# Patient Record
Sex: FEMALE | Race: WHITE | NOT HISPANIC OR LATINO | Employment: FULL TIME | URBAN - NONMETROPOLITAN AREA
[De-identification: names, ages, dates, MRNs, and addresses within clinical notes are randomized per-mention and may not be internally consistent; named-entity substitution may affect disease eponyms.]

---

## 2018-08-11 ENCOUNTER — APPOINTMENT (OUTPATIENT)
Dept: LAB | Facility: HOSPITAL | Age: 23
End: 2018-08-11
Payer: COMMERCIAL

## 2018-08-11 ENCOUNTER — TRANSCRIBE ORDERS (OUTPATIENT)
Dept: ADMINISTRATIVE | Facility: HOSPITAL | Age: 23
End: 2018-08-11

## 2018-08-11 DIAGNOSIS — R73.09 OTHER ABNORMAL GLUCOSE: ICD-10-CM

## 2018-08-11 DIAGNOSIS — N92.6 IRREGULAR MENSTRUAL CYCLE: ICD-10-CM

## 2018-08-11 DIAGNOSIS — I37.1 PULMONARY VALVE INSUFFICIENCY, UNSPECIFIED ETIOLOGY: ICD-10-CM

## 2018-08-11 DIAGNOSIS — Z86.79 PERSONAL HISTORY OF CARDIOVASCULAR DISORDER: Primary | ICD-10-CM

## 2018-08-11 DIAGNOSIS — Z86.79 PERSONAL HISTORY OF CARDIOVASCULAR DISORDER: ICD-10-CM

## 2018-08-11 DIAGNOSIS — Q22.0 CONGENITAL ATRESIA OF PULMONARY VALVE: ICD-10-CM

## 2018-08-11 LAB
ALBUMIN SERPL BCP-MCNC: 4 G/DL (ref 3.5–5)
ALP SERPL-CCNC: 123 U/L (ref 46–116)
ALT SERPL W P-5'-P-CCNC: 41 U/L (ref 12–78)
ANION GAP SERPL CALCULATED.3IONS-SCNC: 5 MMOL/L (ref 4–13)
AST SERPL W P-5'-P-CCNC: 29 U/L (ref 5–45)
BILIRUB SERPL-MCNC: 0.7 MG/DL (ref 0.2–1)
BUN SERPL-MCNC: 15 MG/DL (ref 5–25)
CALCIUM SERPL-MCNC: 9.1 MG/DL (ref 8.3–10.1)
CHLORIDE SERPL-SCNC: 102 MMOL/L (ref 100–108)
CHOLEST SERPL-MCNC: 151 MG/DL (ref 50–200)
CO2 SERPL-SCNC: 30 MMOL/L (ref 21–32)
CREAT SERPL-MCNC: 0.75 MG/DL (ref 0.6–1.3)
DIGOXIN SERPL-MCNC: 1.1 NG/ML (ref 0.8–2)
EST. AVERAGE GLUCOSE BLD GHB EST-MCNC: 128 MG/DL
GFR SERPL CREATININE-BSD FRML MDRD: 114 ML/MIN/1.73SQ M
GLUCOSE P FAST SERPL-MCNC: 98 MG/DL (ref 65–99)
HBA1C MFR BLD: 6.1 % (ref 4.2–6.3)
HDLC SERPL-MCNC: 40 MG/DL (ref 40–60)
INSULIN SERPL-ACNC: 37 MU/L (ref 3–25)
LDLC SERPL CALC-MCNC: 92 MG/DL (ref 0–100)
NONHDLC SERPL-MCNC: 111 MG/DL
NT-PROBNP SERPL-MCNC: 152 PG/ML
POTASSIUM SERPL-SCNC: 3.8 MMOL/L (ref 3.5–5.3)
PROT SERPL-MCNC: 7.6 G/DL (ref 6.4–8.2)
SODIUM SERPL-SCNC: 137 MMOL/L (ref 136–145)
T4 FREE SERPL-MCNC: 1.16 NG/DL (ref 0.76–1.46)
TRIGL SERPL-MCNC: 93 MG/DL
TSH SERPL DL<=0.05 MIU/L-ACNC: 2.94 UIU/ML (ref 0.36–3.74)

## 2018-08-11 PROCEDURE — 80162 ASSAY OF DIGOXIN TOTAL: CPT

## 2018-08-11 PROCEDURE — 83525 ASSAY OF INSULIN: CPT

## 2018-08-11 PROCEDURE — 84439 ASSAY OF FREE THYROXINE: CPT

## 2018-08-11 PROCEDURE — 36415 COLL VENOUS BLD VENIPUNCTURE: CPT

## 2018-08-11 PROCEDURE — 80053 COMPREHEN METABOLIC PANEL: CPT

## 2018-08-11 PROCEDURE — 83880 ASSAY OF NATRIURETIC PEPTIDE: CPT

## 2018-08-11 PROCEDURE — 80061 LIPID PANEL: CPT

## 2018-08-11 PROCEDURE — 84443 ASSAY THYROID STIM HORMONE: CPT

## 2018-08-11 PROCEDURE — 83036 HEMOGLOBIN GLYCOSYLATED A1C: CPT

## 2020-10-19 ENCOUNTER — OFFICE VISIT (OUTPATIENT)
Dept: URGENT CARE | Facility: CLINIC | Age: 25
End: 2020-10-19
Payer: COMMERCIAL

## 2020-10-19 VITALS
BODY MASS INDEX: 34.15 KG/M2 | OXYGEN SATURATION: 92 % | RESPIRATION RATE: 18 BRPM | TEMPERATURE: 97 F | WEIGHT: 200 LBS | HEIGHT: 64 IN | HEART RATE: 104 BPM

## 2020-10-19 DIAGNOSIS — Z20.822 ENCOUNTER FOR LABORATORY TESTING FOR COVID-19 VIRUS: Primary | ICD-10-CM

## 2020-10-19 DIAGNOSIS — J06.9 VIRAL URI: ICD-10-CM

## 2020-10-19 PROCEDURE — 99203 OFFICE O/P NEW LOW 30 MIN: CPT | Performed by: EMERGENCY MEDICINE

## 2020-10-19 PROCEDURE — U0003 INFECTIOUS AGENT DETECTION BY NUCLEIC ACID (DNA OR RNA); SEVERE ACUTE RESPIRATORY SYNDROME CORONAVIRUS 2 (SARS-COV-2) (CORONAVIRUS DISEASE [COVID-19]), AMPLIFIED PROBE TECHNIQUE, MAKING USE OF HIGH THROUGHPUT TECHNOLOGIES AS DESCRIBED BY CMS-2020-01-R: HCPCS | Performed by: EMERGENCY MEDICINE

## 2020-10-19 RX ORDER — HYDROCHLOROTHIAZIDE 12.5 MG/1
12.5 TABLET ORAL DAILY
COMMUNITY

## 2020-10-19 RX ORDER — DIGOXIN 250 MCG
250 TABLET ORAL EVERY MORNING
COMMUNITY
Start: 2020-05-13

## 2020-10-21 LAB — SARS-COV-2 RNA SPEC QL NAA+PROBE: NOT DETECTED

## 2020-12-27 ENCOUNTER — HOSPITAL ENCOUNTER (EMERGENCY)
Facility: HOSPITAL | Age: 25
Discharge: HOME | End: 2020-12-27
Attending: EMERGENCY MEDICINE | Admitting: EMERGENCY MEDICINE
Payer: COMMERCIAL

## 2020-12-27 ENCOUNTER — APPOINTMENT (EMERGENCY)
Dept: CT IMAGING | Facility: HOSPITAL | Age: 25
End: 2020-12-27
Payer: COMMERCIAL

## 2020-12-27 VITALS
TEMPERATURE: 97.5 F | WEIGHT: 223.99 LBS | HEART RATE: 117 BPM | RESPIRATION RATE: 33 BRPM | SYSTOLIC BLOOD PRESSURE: 147 MMHG | HEIGHT: 64 IN | BODY MASS INDEX: 38.24 KG/M2 | DIASTOLIC BLOOD PRESSURE: 79 MMHG | OXYGEN SATURATION: 93 %

## 2020-12-27 DIAGNOSIS — R19.7 ABDOMINAL PAIN, VOMITING, AND DIARRHEA: Primary | ICD-10-CM

## 2020-12-27 DIAGNOSIS — R10.9 ABDOMINAL PAIN, VOMITING, AND DIARRHEA: Primary | ICD-10-CM

## 2020-12-27 DIAGNOSIS — R11.10 ABDOMINAL PAIN, VOMITING, AND DIARRHEA: Primary | ICD-10-CM

## 2020-12-27 LAB
ALBUMIN SERPL BCP-MCNC: 4.2 G/DL (ref 3.5–5)
ALP SERPL-CCNC: 131 U/L (ref 46–116)
ALT SERPL W P-5'-P-CCNC: 32 U/L (ref 12–78)
ANION GAP SERPL CALCULATED.3IONS-SCNC: 13 MMOL/L (ref 4–13)
AST SERPL W P-5'-P-CCNC: 25 U/L (ref 5–45)
BACTERIA UR QL AUTO: NORMAL /HPF
BASOPHILS # BLD AUTO: 0.04 THOUSANDS/ΜL (ref 0–0.1)
BASOPHILS NFR BLD AUTO: 0 % (ref 0–1)
BILIRUB SERPL-MCNC: 0.86 MG/DL (ref 0.2–1)
BILIRUB UR QL STRIP: ABNORMAL
BUN SERPL-MCNC: 16 MG/DL (ref 5–25)
CALCIUM SERPL-MCNC: 9.7 MG/DL (ref 8.3–10.1)
CHLORIDE SERPL-SCNC: 101 MMOL/L (ref 100–108)
CLARITY UR: ABNORMAL
CO2 SERPL-SCNC: 25 MMOL/L (ref 21–32)
COLOR UR: YELLOW
CREAT SERPL-MCNC: 0.85 MG/DL (ref 0.6–1.3)
EOSINOPHIL # BLD AUTO: 0.06 THOUSAND/ΜL (ref 0–0.61)
EOSINOPHIL NFR BLD AUTO: 0 % (ref 0–6)
ERYTHROCYTE [DISTWIDTH] IN BLOOD BY AUTOMATED COUNT: 13.1 % (ref 11.6–15.1)
EXT PREG TEST URINE: NEGATIVE
EXT. CONTROL ED NAV: NORMAL
FLUAV RNA RESP QL NAA+PROBE: NEGATIVE
FLUBV RNA RESP QL NAA+PROBE: NEGATIVE
GFR SERPL CREATININE-BSD FRML MDRD: 96 ML/MIN/1.73SQ M
GLUCOSE SERPL-MCNC: 176 MG/DL (ref 65–140)
GLUCOSE UR STRIP-MCNC: NEGATIVE MG/DL
HCT VFR BLD AUTO: 49.6 % (ref 34.8–46.1)
HGB BLD-MCNC: 16.1 G/DL (ref 11.5–15.4)
HGB UR QL STRIP.AUTO: NEGATIVE
IMM GRANULOCYTES # BLD AUTO: 0.07 THOUSAND/UL (ref 0–0.2)
IMM GRANULOCYTES NFR BLD AUTO: 0 % (ref 0–2)
KETONES UR STRIP-MCNC: NEGATIVE MG/DL
LEUKOCYTE ESTERASE UR QL STRIP: NEGATIVE
LYMPHOCYTES # BLD AUTO: 0.87 THOUSANDS/ΜL (ref 0.6–4.47)
LYMPHOCYTES NFR BLD AUTO: 5 % (ref 14–44)
MCH RBC QN AUTO: 27.7 PG (ref 26.8–34.3)
MCHC RBC AUTO-ENTMCNC: 32.5 G/DL (ref 31.4–37.4)
MCV RBC AUTO: 85 FL (ref 82–98)
MONOCYTES # BLD AUTO: 1.13 THOUSAND/ΜL (ref 0.17–1.22)
MONOCYTES NFR BLD AUTO: 6 % (ref 4–12)
NEUTROPHILS # BLD AUTO: 16.54 THOUSANDS/ΜL (ref 1.85–7.62)
NEUTS SEG NFR BLD AUTO: 89 % (ref 43–75)
NITRITE UR QL STRIP: NEGATIVE
NON-SQ EPI CELLS URNS QL MICRO: NORMAL /HPF
NRBC BLD AUTO-RTO: 0 /100 WBCS
PH UR STRIP.AUTO: 5.5 [PH]
PLATELET # BLD AUTO: 306 THOUSANDS/UL (ref 149–390)
PMV BLD AUTO: 9.3 FL (ref 8.9–12.7)
POTASSIUM SERPL-SCNC: 3.7 MMOL/L (ref 3.5–5.3)
PROT SERPL-MCNC: 9.1 G/DL (ref 6.4–8.2)
PROT UR STRIP-MCNC: ABNORMAL MG/DL
RBC # BLD AUTO: 5.82 MILLION/UL (ref 3.81–5.12)
RBC #/AREA URNS AUTO: NORMAL /HPF
RSV RNA RESP QL NAA+PROBE: NEGATIVE
SARS-COV-2 RNA RESP QL NAA+PROBE: NEGATIVE
SODIUM SERPL-SCNC: 139 MMOL/L (ref 136–145)
SP GR UR STRIP.AUTO: >=1.03 (ref 1–1.03)
UROBILINOGEN UR QL STRIP.AUTO: 0.2 E.U./DL
WBC # BLD AUTO: 18.71 THOUSAND/UL (ref 4.31–10.16)
WBC #/AREA URNS AUTO: NORMAL /HPF

## 2020-12-27 PROCEDURE — 99284 EMERGENCY DEPT VISIT MOD MDM: CPT | Performed by: EMERGENCY MEDICINE

## 2020-12-27 PROCEDURE — 93005 ELECTROCARDIOGRAM TRACING: CPT

## 2020-12-27 PROCEDURE — 96374 THER/PROPH/DIAG INJ IV PUSH: CPT

## 2020-12-27 PROCEDURE — 85025 COMPLETE CBC W/AUTO DIFF WBC: CPT | Performed by: EMERGENCY MEDICINE

## 2020-12-27 PROCEDURE — 80053 COMPREHEN METABOLIC PANEL: CPT | Performed by: EMERGENCY MEDICINE

## 2020-12-27 PROCEDURE — 96375 TX/PRO/DX INJ NEW DRUG ADDON: CPT

## 2020-12-27 PROCEDURE — 0241U HB NFCT DS VIR RESP RNA 4 TRGT: CPT | Performed by: EMERGENCY MEDICINE

## 2020-12-27 PROCEDURE — 96361 HYDRATE IV INFUSION ADD-ON: CPT

## 2020-12-27 PROCEDURE — 36415 COLL VENOUS BLD VENIPUNCTURE: CPT | Performed by: EMERGENCY MEDICINE

## 2020-12-27 PROCEDURE — 99284 EMERGENCY DEPT VISIT MOD MDM: CPT

## 2020-12-27 PROCEDURE — G1004 CDSM NDSC: HCPCS

## 2020-12-27 PROCEDURE — 74177 CT ABD & PELVIS W/CONTRAST: CPT

## 2020-12-27 PROCEDURE — 81001 URINALYSIS AUTO W/SCOPE: CPT | Performed by: EMERGENCY MEDICINE

## 2020-12-27 PROCEDURE — 81025 URINE PREGNANCY TEST: CPT | Performed by: EMERGENCY MEDICINE

## 2020-12-27 RX ORDER — SERTRALINE HYDROCHLORIDE 25 MG/1
25 TABLET, FILM COATED ORAL DAILY PRN
COMMUNITY

## 2020-12-27 RX ORDER — KETOROLAC TROMETHAMINE 30 MG/ML
10 INJECTION, SOLUTION INTRAMUSCULAR; INTRAVENOUS ONCE
Status: COMPLETED | OUTPATIENT
Start: 2020-12-27 | End: 2020-12-27

## 2020-12-27 RX ORDER — DIGOXIN 250 MCG
125 TABLET ORAL EVERY EVENING
COMMUNITY

## 2020-12-27 RX ORDER — ONDANSETRON 4 MG/1
4 TABLET, FILM COATED ORAL EVERY 6 HOURS PRN
Qty: 10 TABLET | Refills: 0 | Status: SHIPPED | OUTPATIENT
Start: 2020-12-27 | End: 2021-07-28 | Stop reason: CLARIF

## 2020-12-27 RX ORDER — ONDANSETRON 2 MG/ML
4 INJECTION INTRAMUSCULAR; INTRAVENOUS ONCE
Status: COMPLETED | OUTPATIENT
Start: 2020-12-27 | End: 2020-12-27

## 2020-12-27 RX ADMIN — IOHEXOL 100 ML: 350 INJECTION, SOLUTION INTRAVENOUS at 15:52

## 2020-12-27 RX ADMIN — ONDANSETRON 4 MG: 2 INJECTION INTRAMUSCULAR; INTRAVENOUS at 15:01

## 2020-12-27 RX ADMIN — SODIUM CHLORIDE 1000 ML: 0.9 INJECTION, SOLUTION INTRAVENOUS at 15:01

## 2020-12-27 RX ADMIN — KETOROLAC TROMETHAMINE 9.9 MG: 30 INJECTION, SOLUTION INTRAMUSCULAR at 15:01

## 2020-12-28 LAB
ATRIAL RATE: 121 BPM
P AXIS: 60 DEGREES
PR INTERVAL: 146 MS
QRS AXIS: 91 DEGREES
QRSD INTERVAL: 130 MS
QT INTERVAL: 338 MS
QTC INTERVAL: 479 MS
T WAVE AXIS: 80 DEGREES
VENTRICULAR RATE: 121 BPM

## 2020-12-30 ENCOUNTER — TRANSCRIBE ORDERS (OUTPATIENT)
Dept: ADMINISTRATIVE | Facility: HOSPITAL | Age: 25
End: 2020-12-30

## 2020-12-30 DIAGNOSIS — R93.2 ABNORMAL FINDINGS ON DIAGNOSTIC IMAGING OF LIVER AND BILIARY TRACT: Primary | ICD-10-CM

## 2021-01-06 ENCOUNTER — HOSPITAL ENCOUNTER (OUTPATIENT)
Dept: MRI IMAGING | Facility: HOSPITAL | Age: 26
Discharge: HOME | End: 2021-01-06
Payer: COMMERCIAL

## 2021-01-06 DIAGNOSIS — R93.2 ABNORMAL FINDINGS ON DIAGNOSTIC IMAGING OF LIVER AND BILIARY TRACT: ICD-10-CM

## 2021-01-06 PROCEDURE — 74183 MRI ABD W/O CNTR FLWD CNTR: CPT

## 2021-01-06 PROCEDURE — G1004 CDSM NDSC: HCPCS

## 2021-01-06 PROCEDURE — A9585 GADOBUTROL INJECTION: HCPCS | Performed by: RADIOLOGY

## 2021-01-06 RX ADMIN — GADOBUTROL 10 ML: 604.72 INJECTION INTRAVENOUS at 12:41

## 2021-03-10 DIAGNOSIS — Z23 ENCOUNTER FOR IMMUNIZATION: ICD-10-CM

## 2021-07-28 ENCOUNTER — APPOINTMENT (EMERGENCY)
Dept: RADIOLOGY | Facility: HOSPITAL | Age: 26
DRG: 641 | End: 2021-07-28
Payer: COMMERCIAL

## 2021-07-28 ENCOUNTER — HOSPITAL ENCOUNTER (INPATIENT)
Facility: HOSPITAL | Age: 26
LOS: 1 days | Discharge: HOME | DRG: 641 | End: 2021-07-29
Attending: EMERGENCY MEDICINE | Admitting: FAMILY MEDICINE
Payer: COMMERCIAL

## 2021-07-28 DIAGNOSIS — E87.6 HYPOKALEMIA: Primary | ICD-10-CM

## 2021-07-28 DIAGNOSIS — E87.2 LACTIC ACIDOSIS: ICD-10-CM

## 2021-07-28 DIAGNOSIS — I47.1 SVT (SUPRAVENTRICULAR TACHYCARDIA) (HCC): ICD-10-CM

## 2021-07-28 DIAGNOSIS — R00.0 TACHYCARDIA: ICD-10-CM

## 2021-07-28 PROBLEM — I47.10 SVT (SUPRAVENTRICULAR TACHYCARDIA): Status: ACTIVE | Noted: 2021-07-28

## 2021-07-28 PROBLEM — E87.20 LACTIC ACIDOSIS: Status: ACTIVE | Noted: 2021-07-28

## 2021-07-28 PROBLEM — K74.69 OTHER CIRRHOSIS OF LIVER (HCC): Status: ACTIVE | Noted: 2021-07-28

## 2021-07-28 PROBLEM — E11.9 TYPE 2 DIABETES MELLITUS WITHOUT COMPLICATION, WITHOUT LONG-TERM CURRENT USE OF INSULIN (HCC): Status: ACTIVE | Noted: 2021-07-28

## 2021-07-28 LAB
ALBUMIN SERPL BCP-MCNC: 4.1 G/DL (ref 3.5–5)
ALP SERPL-CCNC: 130 U/L (ref 46–116)
ALT SERPL W P-5'-P-CCNC: 33 U/L (ref 12–78)
ANION GAP SERPL CALCULATED.3IONS-SCNC: 16 MMOL/L (ref 4–13)
ANION GAP SERPL CALCULATED.3IONS-SCNC: 18 MMOL/L (ref 4–13)
AST SERPL W P-5'-P-CCNC: 23 U/L (ref 5–45)
B-HCG SERPL-ACNC: <2 MIU/ML
BASOPHILS # BLD AUTO: 0.02 THOUSANDS/ΜL (ref 0–0.1)
BASOPHILS NFR BLD AUTO: 0 % (ref 0–1)
BILIRUB SERPL-MCNC: 0.85 MG/DL (ref 0.2–1)
BUN SERPL-MCNC: 10 MG/DL (ref 5–25)
BUN SERPL-MCNC: 9 MG/DL (ref 5–25)
CALCIUM SERPL-MCNC: 8.7 MG/DL (ref 8.3–10.1)
CALCIUM SERPL-MCNC: 9.3 MG/DL (ref 8.3–10.1)
CHLORIDE SERPL-SCNC: 101 MMOL/L (ref 100–108)
CHLORIDE SERPL-SCNC: 105 MMOL/L (ref 100–108)
CO2 SERPL-SCNC: 21 MMOL/L (ref 21–32)
CO2 SERPL-SCNC: 22 MMOL/L (ref 21–32)
CREAT SERPL-MCNC: 0.99 MG/DL (ref 0.6–1.3)
CREAT SERPL-MCNC: 1.08 MG/DL (ref 0.6–1.3)
D DIMER PPP FEU-MCNC: 0.44 UG/ML FEU
DIGOXIN SERPL-MCNC: 0.9 NG/ML (ref 0.8–2)
EOSINOPHIL # BLD AUTO: 0.08 THOUSAND/ΜL (ref 0–0.61)
EOSINOPHIL NFR BLD AUTO: 1 % (ref 0–6)
ERYTHROCYTE [DISTWIDTH] IN BLOOD BY AUTOMATED COUNT: 12.4 % (ref 11.6–15.1)
GFR SERPL CREATININE-BSD FRML MDRD: 72 ML/MIN/1.73SQ M
GFR SERPL CREATININE-BSD FRML MDRD: 79 ML/MIN/1.73SQ M
GLUCOSE SERPL-MCNC: 138 MG/DL (ref 65–140)
GLUCOSE SERPL-MCNC: 143 MG/DL (ref 65–140)
GLUCOSE SERPL-MCNC: 162 MG/DL (ref 65–140)
GLUCOSE SERPL-MCNC: 165 MG/DL (ref 65–140)
GLUCOSE SERPL-MCNC: 222 MG/DL (ref 65–140)
HCT VFR BLD AUTO: 45.9 % (ref 34.8–46.1)
HGB BLD-MCNC: 15 G/DL (ref 11.5–15.4)
IMM GRANULOCYTES # BLD AUTO: 0.06 THOUSAND/UL (ref 0–0.2)
IMM GRANULOCYTES NFR BLD AUTO: 0 % (ref 0–2)
LACTATE SERPL-SCNC: 4 MMOL/L (ref 0.5–2)
LACTATE SERPL-SCNC: 5.3 MMOL/L (ref 0.5–2)
LACTATE SERPL-SCNC: 6.8 MMOL/L (ref 0.5–2)
LIPASE SERPL-CCNC: 126 U/L (ref 73–393)
LYMPHOCYTES # BLD AUTO: 1.64 THOUSANDS/ΜL (ref 0.6–4.47)
LYMPHOCYTES NFR BLD AUTO: 12 % (ref 14–44)
MAGNESIUM SERPL-MCNC: 1.7 MG/DL (ref 1.6–2.6)
MCH RBC QN AUTO: 27.6 PG (ref 26.8–34.3)
MCHC RBC AUTO-ENTMCNC: 32.7 G/DL (ref 31.4–37.4)
MCV RBC AUTO: 84 FL (ref 82–98)
MONOCYTES # BLD AUTO: 0.48 THOUSAND/ΜL (ref 0.17–1.22)
MONOCYTES NFR BLD AUTO: 4 % (ref 4–12)
NEUTROPHILS # BLD AUTO: 11.59 THOUSANDS/ΜL (ref 1.85–7.62)
NEUTS SEG NFR BLD AUTO: 83 % (ref 43–75)
NRBC BLD AUTO-RTO: 0 /100 WBCS
PHOSPHATE SERPL-MCNC: 2.2 MG/DL (ref 2.7–4.5)
PLATELET # BLD AUTO: 326 THOUSANDS/UL (ref 149–390)
PMV BLD AUTO: 9.2 FL (ref 8.9–12.7)
POTASSIUM SERPL-SCNC: 2.3 MMOL/L (ref 3.5–5.3)
POTASSIUM SERPL-SCNC: 2.9 MMOL/L (ref 3.5–5.3)
PROT SERPL-MCNC: 8.3 G/DL (ref 6.4–8.2)
RBC # BLD AUTO: 5.44 MILLION/UL (ref 3.81–5.12)
SODIUM SERPL-SCNC: 140 MMOL/L (ref 136–145)
SODIUM SERPL-SCNC: 143 MMOL/L (ref 136–145)
TROPONIN I SERPL-MCNC: <0.02 NG/ML
WBC # BLD AUTO: 13.87 THOUSAND/UL (ref 4.31–10.16)

## 2021-07-28 PROCEDURE — 36415 COLL VENOUS BLD VENIPUNCTURE: CPT | Performed by: EMERGENCY MEDICINE

## 2021-07-28 PROCEDURE — 85379 FIBRIN DEGRADATION QUANT: CPT | Performed by: EMERGENCY MEDICINE

## 2021-07-28 PROCEDURE — 83690 ASSAY OF LIPASE: CPT | Performed by: EMERGENCY MEDICINE

## 2021-07-28 PROCEDURE — 99285 EMERGENCY DEPT VISIT HI MDM: CPT

## 2021-07-28 PROCEDURE — 93005 ELECTROCARDIOGRAM TRACING: CPT

## 2021-07-28 PROCEDURE — 82948 REAGENT STRIP/BLOOD GLUCOSE: CPT

## 2021-07-28 PROCEDURE — 80048 BASIC METABOLIC PNL TOTAL CA: CPT | Performed by: FAMILY MEDICINE

## 2021-07-28 PROCEDURE — 84702 CHORIONIC GONADOTROPIN TEST: CPT | Performed by: EMERGENCY MEDICINE

## 2021-07-28 PROCEDURE — 80053 COMPREHEN METABOLIC PANEL: CPT | Performed by: EMERGENCY MEDICINE

## 2021-07-28 PROCEDURE — 84484 ASSAY OF TROPONIN QUANT: CPT | Performed by: EMERGENCY MEDICINE

## 2021-07-28 PROCEDURE — 96361 HYDRATE IV INFUSION ADD-ON: CPT

## 2021-07-28 PROCEDURE — 83605 ASSAY OF LACTIC ACID: CPT | Performed by: EMERGENCY MEDICINE

## 2021-07-28 PROCEDURE — 83605 ASSAY OF LACTIC ACID: CPT | Performed by: FAMILY MEDICINE

## 2021-07-28 PROCEDURE — 99285 EMERGENCY DEPT VISIT HI MDM: CPT | Performed by: EMERGENCY MEDICINE

## 2021-07-28 PROCEDURE — 80162 ASSAY OF DIGOXIN TOTAL: CPT | Performed by: EMERGENCY MEDICINE

## 2021-07-28 PROCEDURE — 85025 COMPLETE CBC W/AUTO DIFF WBC: CPT | Performed by: EMERGENCY MEDICINE

## 2021-07-28 PROCEDURE — 99223 1ST HOSP IP/OBS HIGH 75: CPT | Performed by: FAMILY MEDICINE

## 2021-07-28 PROCEDURE — 83735 ASSAY OF MAGNESIUM: CPT | Performed by: EMERGENCY MEDICINE

## 2021-07-28 PROCEDURE — 71045 X-RAY EXAM CHEST 1 VIEW: CPT

## 2021-07-28 PROCEDURE — 96374 THER/PROPH/DIAG INJ IV PUSH: CPT

## 2021-07-28 PROCEDURE — 84100 ASSAY OF PHOSPHORUS: CPT | Performed by: EMERGENCY MEDICINE

## 2021-07-28 PROCEDURE — 96375 TX/PRO/DX INJ NEW DRUG ADDON: CPT

## 2021-07-28 RX ORDER — ONDANSETRON 2 MG/ML
4 INJECTION INTRAMUSCULAR; INTRAVENOUS EVERY 6 HOURS PRN
Status: DISCONTINUED | OUTPATIENT
Start: 2021-07-28 | End: 2021-07-28 | Stop reason: SDUPTHER

## 2021-07-28 RX ORDER — POTASSIUM CHLORIDE 20 MEQ/1
40 TABLET, EXTENDED RELEASE ORAL ONCE
Status: COMPLETED | OUTPATIENT
Start: 2021-07-28 | End: 2021-07-28

## 2021-07-28 RX ORDER — POTASSIUM CHLORIDE 14.9 MG/ML
20 INJECTION INTRAVENOUS ONCE
Status: COMPLETED | OUTPATIENT
Start: 2021-07-28 | End: 2021-07-28

## 2021-07-28 RX ORDER — POTASSIUM CHLORIDE 20 MEQ/1
20 TABLET, EXTENDED RELEASE ORAL ONCE
Status: COMPLETED | OUTPATIENT
Start: 2021-07-28 | End: 2021-07-28

## 2021-07-28 RX ORDER — METOPROLOL TARTRATE 5 MG/5ML
2.5 INJECTION INTRAVENOUS EVERY 6 HOURS PRN
Status: DISCONTINUED | OUTPATIENT
Start: 2021-07-28 | End: 2021-07-29 | Stop reason: HOSPADM

## 2021-07-28 RX ORDER — ACETAMINOPHEN 325 MG/1
650 TABLET ORAL EVERY 6 HOURS PRN
Status: DISCONTINUED | OUTPATIENT
Start: 2021-07-28 | End: 2021-07-29 | Stop reason: HOSPADM

## 2021-07-28 RX ORDER — METOPROLOL TARTRATE 5 MG/5ML
5 INJECTION INTRAVENOUS ONCE
Status: COMPLETED | OUTPATIENT
Start: 2021-07-28 | End: 2021-07-28

## 2021-07-28 RX ORDER — DIGOXIN 125 MCG
125 TABLET ORAL EVERY EVENING
Status: DISCONTINUED | OUTPATIENT
Start: 2021-07-28 | End: 2021-07-29 | Stop reason: HOSPADM

## 2021-07-28 RX ORDER — ONDANSETRON 2 MG/ML
4 INJECTION INTRAMUSCULAR; INTRAVENOUS ONCE
Status: COMPLETED | OUTPATIENT
Start: 2021-07-28 | End: 2021-07-28

## 2021-07-28 RX ORDER — DIGOXIN 125 MCG
250 TABLET ORAL EVERY MORNING
Status: DISCONTINUED | OUTPATIENT
Start: 2021-07-29 | End: 2021-07-29 | Stop reason: HOSPADM

## 2021-07-28 RX ORDER — SODIUM CHLORIDE 9 MG/ML
75 INJECTION, SOLUTION INTRAVENOUS CONTINUOUS
Status: DISCONTINUED | OUTPATIENT
Start: 2021-07-28 | End: 2021-07-29 | Stop reason: HOSPADM

## 2021-07-28 RX ORDER — EMPAGLIFLOZIN 10 MG/1
10 TABLET, FILM COATED ORAL EVERY MORNING
COMMUNITY

## 2021-07-28 RX ORDER — ONDANSETRON 2 MG/ML
4 INJECTION INTRAMUSCULAR; INTRAVENOUS EVERY 8 HOURS PRN
Status: DISCONTINUED | OUTPATIENT
Start: 2021-07-28 | End: 2021-07-29 | Stop reason: HOSPADM

## 2021-07-28 RX ADMIN — POTASSIUM CHLORIDE 20 MEQ: 1500 TABLET, EXTENDED RELEASE ORAL at 21:12

## 2021-07-28 RX ADMIN — SODIUM CHLORIDE 75 ML/HR: 0.9 INJECTION, SOLUTION INTRAVENOUS at 22:23

## 2021-07-28 RX ADMIN — METOPROLOL TARTRATE 5 MG: 1 INJECTION, SOLUTION INTRAVENOUS at 15:57

## 2021-07-28 RX ADMIN — POTASSIUM CHLORIDE 20 MEQ: 200 INJECTION, SOLUTION INTRAVENOUS at 20:09

## 2021-07-28 RX ADMIN — ONDANSETRON 4 MG: 2 INJECTION INTRAMUSCULAR; INTRAVENOUS at 22:54

## 2021-07-28 RX ADMIN — DIGOXIN 125 MCG: 125 TABLET ORAL at 22:54

## 2021-07-28 RX ADMIN — SODIUM CHLORIDE 1000 ML: 0.9 INJECTION, SOLUTION INTRAVENOUS at 20:09

## 2021-07-28 RX ADMIN — POTASSIUM CHLORIDE 40 MEQ: 1500 TABLET, EXTENDED RELEASE ORAL at 20:08

## 2021-07-28 RX ADMIN — ONDANSETRON 4 MG: 2 INJECTION INTRAMUSCULAR; INTRAVENOUS at 15:56

## 2021-07-28 RX ADMIN — INSULIN LISPRO 1 UNITS: 100 INJECTION, SOLUTION INTRAVENOUS; SUBCUTANEOUS at 21:20

## 2021-07-28 RX ADMIN — METOPROLOL TARTRATE 5 MG: 1 INJECTION, SOLUTION INTRAVENOUS at 16:11

## 2021-07-28 RX ADMIN — POTASSIUM CHLORIDE 40 MEQ: 1500 TABLET, EXTENDED RELEASE ORAL at 17:23

## 2021-07-28 RX ADMIN — POTASSIUM CHLORIDE 20 MEQ: 14.9 INJECTION, SOLUTION INTRAVENOUS at 17:32

## 2021-07-28 RX ADMIN — SODIUM CHLORIDE 1000 ML: 0.9 INJECTION, SOLUTION INTRAVENOUS at 15:56

## 2021-07-28 NOTE — ASSESSMENT & PLAN NOTE
No acute infection at this time  probably secondary to SVT    Placed on IV fluid boluses and repeat lactic acid ordered

## 2021-07-28 NOTE — ASSESSMENT & PLAN NOTE
Secondary to inadequate oral intake  Denies any recent illness or nausea vomiting or diarrhea  Potassium level is 2 3  Will repeat potassium level again now     Replace potassium IV and oral until it is more than 4  Check magnesium level

## 2021-07-28 NOTE — ED PROVIDER NOTES
History  Chief Complaint   Patient presents with    Rapid Heart Rate     pt has hx of SVT  states she felt she has been in SVT since noon  also reports nausea and vomiting     Patient with a history of SVT  Complains of palpitations starting around 12:00 p m  Today  Has nausea vomiting  No chest pain  No abdominal pain  Is compliant with her medications  Takes digoxin  No recent cough or cold symptoms  History provided by:  Patient   used: No    Rapid Heart Rate  Palpitations quality:  Fast  Onset quality:  Sudden  Duration:  3 hours  Timing:  Constant  Progression:  Waxing and waning  Chronicity:  Recurrent  Context: not blood loss, not dehydration and not nicotine    Relieved by:  Nothing  Worsened by:  Nothing  Associated symptoms: nausea, vomiting and weakness    Associated symptoms: no back pain, no chest pain, no chest pressure, no cough, no dizziness, no PND and no shortness of breath        Prior to Admission Medications   Prescriptions Last Dose Informant Patient Reported?  Taking?   digoxin (LANOXIN) 0 25 mg tablet   Yes No   Sig: Take 250 mcg by mouth every evening    digoxin (LANOXIN) 0 25 mg tablet   Yes No   Sig: Take 125 mcg by mouth every morning   hydrochlorothiazide (HYDRODIURIL) 12 5 mg tablet   Yes No   Sig: Take 12 5 mg by mouth daily    metFORMIN (GLUCOPHAGE) 500 mg tablet   Yes No   Sig: Take 500 mg by mouth 2 (two) times a day with meals    ondansetron (ZOFRAN) 4 mg tablet   No No   Sig: Take 1 tablet (4 mg total) by mouth every 6 (six) hours as needed for nausea or vomiting   sertraline (ZOLOFT) 25 mg tablet   Yes No   Sig: Take 25 mg by mouth daily      Facility-Administered Medications: None       Past Medical History:   Diagnosis Date    [facility]osis [facility] liver ([facility] )     Diabetes mellitus ([facility] )     IBS (irritable bowel syndrome)     Pulmonary atresia     SVT (supraventricular tachycardia) (HCC)        Past Surgical History:   Procedure Laterality Date    CARDIAC SURGERY         Family History   Problem Relation Age of Onset    No Known Problems Mother     No Known Problems Father      I have reviewed and agree with the history as documented  E-Cigarette/Vaping    E-Cigarette Use Never User      E-Cigarette/Vaping Substances     Social History     Tobacco Use    Smoking status: Never Smoker    Smokeless tobacco: Never Used   Vaping Use    Vaping Use: Never used   Substance Use Topics    Alcohol use: Not Currently    Drug use: Never       Review of Systems   Constitutional: Negative for chills and fever  HENT: Negative for ear pain, hearing loss, sore throat, trouble swallowing and voice change  Eyes: Negative for pain and discharge  Respiratory: Negative for cough, shortness of breath and wheezing  Cardiovascular: Positive for palpitations  Negative for chest pain and PND  Gastrointestinal: Positive for nausea and vomiting  Negative for abdominal pain, blood in stool, constipation and diarrhea  Genitourinary: Negative for dysuria, flank pain, frequency and hematuria  Musculoskeletal: Negative for back pain, joint swelling, neck pain and neck stiffness  Skin: Negative for rash and wound  Neurological: Positive for weakness  Negative for dizziness, seizures, syncope, facial asymmetry and headaches  Psychiatric/Behavioral: Negative for hallucinations, self-injury and suicidal ideas  All other systems reviewed and are negative  Physical Exam  Physical Exam  Vitals and nursing note reviewed  Constitutional:       General: She is not in acute distress  Appearance: She is well-developed  HENT:      Head: Normocephalic and atraumatic  Right Ear: External ear normal       Left Ear: External ear normal    Eyes:      General: No scleral icterus  Right eye: No discharge  Left eye: No discharge  Extraocular Movements: Extraocular movements intact        Conjunctiva/sclera: Conjunctivae normal  Cardiovascular:      Rate and Rhythm: Regular rhythm  Tachycardia present  Heart sounds: Normal heart sounds  No murmur heard  Pulmonary:      Effort: Pulmonary effort is normal       Breath sounds: Normal breath sounds  No wheezing or rales  Abdominal:      General: Bowel sounds are normal  There is no distension  Palpations: Abdomen is soft  Tenderness: There is no abdominal tenderness  There is no guarding or rebound  Musculoskeletal:         General: No deformity  Normal range of motion  Cervical back: Normal range of motion and neck supple  Skin:     General: Skin is warm and dry  Findings: No rash  Neurological:      General: No focal deficit present  Mental Status: She is alert and oriented to person, place, and time  Cranial Nerves: No cranial nerve deficit  Psychiatric:         Mood and Affect: Mood normal          Behavior: Behavior normal          Thought Content:  Thought content normal          Judgment: Judgment normal          Vital Signs  ED Triage Vitals   Temperature Pulse Respirations Blood Pressure SpO2   07/28/21 1541 07/28/21 1539 07/28/21 1541 07/28/21 1541 07/28/21 1539   97 7 °F (36 5 °C) (!) 130 20 144/70 94 %      Temp Source Heart Rate Source Patient Position - Orthostatic VS BP Location FiO2 (%)   07/28/21 1541 07/28/21 1539 07/28/21 1539 07/28/21 1541 --   Temporal Monitor Lying Right arm       Pain Score       07/28/21 1539       No Pain           Vitals:    07/28/21 1541 07/28/21 1605 07/28/21 1610 07/28/21 1615   BP: 144/70 123/63 136/69 123/64   Pulse: (!) 141 (!) 109 (!) 111 100   Patient Position - Orthostatic VS: Sitting Sitting Sitting Sitting         Visual Acuity      ED Medications  Medications   potassium chloride (K-DUR,KLOR-CON) CR tablet 40 mEq (has no administration in time range)   sodium chloride 0 9 % bolus 1,000 mL (has no administration in time range)   potassium chloride 20 mEq IVPB (premix) (has no administration in time range)   sodium chloride 0 9 % bolus 1,000 mL (1,000 mL Intravenous New Bag 7/28/21 1556)   ondansetron (ZOFRAN) injection 4 mg (4 mg Intravenous Given 7/28/21 1556)   metoprolol (LOPRESSOR) injection 5 mg (5 mg Intravenous Given 7/28/21 1557)   metoprolol (LOPRESSOR) injection 5 mg (5 mg Intravenous Given 7/28/21 1611)       Diagnostic Studies  Results Reviewed     Procedure Component Value Units Date/Time    hCG, quantitative [[id number]]  (Normal) Collected: 07/28/21 1606    Lab Status: Final result Specimen: Blood from Arm, Left Updated: 07/28/21 1701     HCG, Quant <2 mIU/mL     Narrative:       Expected Ranges:     Approximate               Approximate HCG  Gestation age          Concentration ( mIU/mL)  _____________          ______________________   Arna Darlene                      HCG values  0 2-1                       5-50  1-2                           2-3                         100-5000  3-4                         500-14560  4-5                         1000-98573  5-6                         [id number]  6-8                         56539-209039  8-12                        [id number]      UA w Reflex to Microscopic w Reflex to Culture [[id number]]     Lab Status: No result Specimen: Urine     Magnesium [[id number]]     Lab Status: No result Specimen: Blood     Phosphorus [[id number]]     Lab Status: No result Specimen: Blood     Lactic acid [[id number]]  (Abnormal) Collected: 07/28/21 1606    Lab Status: Final result Specimen: Blood from Arm, Left Updated: 07/28/21 1642     LACTIC ACID 6 8 mmol/L     Narrative:      Result may be elevated if tourniquet was used during collection      Lactic acid 2 Hours [[id number]]     Lab Status: No result Specimen: Blood     Comprehensive metabolic panel [[id number]]  (Abnormal) Collected: 07/28/21 1606    Lab Status: Final result Specimen: Blood from Arm, Left Updated: 07/28/21 1642     Sodium 140 mmol/L      Potassium 2 3 mmol/L      Chloride 101 mmol/L CO2 21 mmol/L      ANION GAP 18 mmol/L      BUN 10 mg/dL      Creatinine 1 08 mg/dL      Glucose 222 mg/dL      Calcium 9 3 mg/dL      AST 23 U/L      ALT 33 U/L      Alkaline Phosphatase 130 U/L      Total Protein 8 3 g/dL      Albumin 4 1 g/dL      Total Bilirubin 0 85 mg/dL      eGFR 72 ml/min/1 73sq m     Narrative:      [place] guidelines for Chronic Kidney Disease (CKD):     Stage 1 with normal or high GFR (GFR > 90 mL/min/1 73 square meters)    Stage 2 Mild CKD (GFR = 60-89 mL/min/1 73 square meters)    Stage 3A Moderate CKD (GFR = 45-59 mL/min/1 73 square meters)    Stage 3B Moderate CKD (GFR = 30-44 mL/min/1 73 square meters)    Stage 4 Severe CKD (GFR = 15-29 mL/min/1 73 square meters)    Stage 5 End Stage CKD (GFR <15 mL/min/1 73 square meters)  Note: GFR calculation is accurate only with a steady state creatinine    Lipase [[id number]]  (Normal) Collected: 07/28/21 1606    Lab Status: Final result Specimen: Blood from Arm, Left Updated: 07/28/21 1640     Lipase 126 u/L     Digoxin level [[id number]]  (Normal) Collected: 07/28/21 1606    Lab Status: Final result Specimen: Blood from Arm, Left Updated: 07/28/21 1640     Digoxin Lvl 0 9 ng/mL     Troponin I [[id number]]  (Normal) Collected: 07/28/21 1606    Lab Status: Final result Specimen: Blood from Arm, Left Updated: 07/28/21 1638     Troponin I <0 02 ng/mL     D-Dimer [[id number]]  (Normal) Collected: 07/28/21 1606    Lab Status: Final result Specimen: Blood from Arm, Left Updated: 07/28/21 1631     D-Dimer, Quant 0 44 ug/ml FEU     CBC and differential [[id number]]  (Abnormal) Collected: 07/28/21 1606    Lab Status: Final result Specimen: Blood from Arm, Left Updated: 07/28/21 1615     WBC 13 87 Thousand/uL      RBC 5 44 Million/uL      Hemoglobin 15 0 g/dL      Hematocrit 45 9 %      MCV 84 fL      MCH 27 6 pg      MCHC 32 7 g/dL      RDW 12 4 %      MPV 9 2 fL      Platelets 672 Thousands/uL      nRBC 0 /100 WBCs Neutrophils Relative 83 %      Immat GRANS % 0 %      Lymphocytes Relative 12 %      Monocytes Relative 4 %      Eosinophils Relative 1 %      Basophils Relative 0 %      Neutrophils Absolute 11 59 Thousands/µL      Immature Grans Absolute 0 06 Thousand/uL      Lymphocytes Absolute 1 64 Thousands/µL      Monocytes Absolute 0 48 Thousand/µL      Eosinophils Absolute 0 08 Thousand/µL      Basophils Absolute 0 02 Thousands/µL                  XR chest 1 view portable    (Results Pending)              Procedures  ECG 12 Lead Documentation Only    Date/Time: 7/28/2021 3:52 PM  Performed by: Meño Morel MD  Authorized by: Meño Morel MD     ECG reviewed by me, the ED Provider: yes    Patient location:  ED  Rate:     ECG rate:  130  Rhythm:     Rhythm: sinus tachycardia    Ectopy:     Ectopy: none    QRS:     QRS axis:  Normal    ECG 12 Lead Documentation Only    Date/Time: 7/28/2021 4:21 PM  Performed by: Meño Morel MD  Authorized by: Meño Morel MD     ECG reviewed by me, the ED Provider: yes    Patient location:  ED  Interpretation:     Interpretation: non-specific    Rate:     ECG rate:  100  Rhythm:     Rhythm: sinus rhythm    Ectopy:     Ectopy: none    QRS:     QRS axis:  Normal             ED Course                                           MDM    Disposition  Final diagnoses:   Hypokalemia   Lactic acidosis   Tachycardia     Time reflects when diagnosis was documented in both MDM as applicable and the Disposition within this note     Time User Action Codes Description Comment    7/28/2021  4:48 PM Earl Mann Add [E87 6] Hypokalemia     7/28/2021  4:48 PM Allyn Mann P Add [E87 2] Lactic acidosis     7/28/2021  4:48 PM Earl Mann Add [R00 0] Tachycardia       ED Disposition     ED Disposition Condition Date/Time Comment    Admit Stable Wed Jul 28, 2021  5:16 PM Case was discussed with Dr Chip Najjar and the patient's admission status was agreed to be to the service of   [facility]            Follow-up Information    None         Patient's Medications   Discharge Prescriptions    No medications on file     No discharge procedures on file      PDMP Review     None          ED Provider  Electronically Signed by           Irving Gama MD  07/28/21 2629

## 2021-07-28 NOTE — ASSESSMENT & PLAN NOTE
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 followed by history of ASD closure, pulmonary valvotomy and shunt placement and history of multiple ablations done  Trial of multiple medications including amiodarone, procainamide etc   Last year taken off amiodarone and kept on digoxin alone  So far has been doing well however today had episode of acute SVT that lasted for almost 2 hours    Received a dose of IV Lopressor in the ER  Place on p r n  IV Lopressor in case heart rate is more than 130  Digoxin level is normal   Continue home regimen of digoxin 250 mcg in the morning and 125 mcg in the evening  Will replace potassium as it is low at 2 3  Check magnesium level  Monitor on telemetry and consult placed to Cardiology and order 2D echo to evaluate LV function  Patient denies any alcohol or drug abuse or recent stressors    Normally follows with Dr Leonela Mcdaniel at [facility]

## 2021-07-28 NOTE — ASSESSMENT & PLAN NOTE
Lab Results   Component Value Date    HGBA1C 6 1 08/11/2018       No results for input(s): POCGLU in the last 72 hours  Blood Sugar Average: Last 72 hrs:   uncontrolled    Continue Jardiance  also placed on insulin sliding scale

## 2021-07-28 NOTE — H&P
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 followed by history of ASD closure, pulmonary valvotomy and shunt placement and history of multiple ablations done  Trial of multiple medications including amiodarone, procainamide etc   Last year taken off amiodarone and kept on digoxin alone  So far has been doing well however today had episode of acute SVT that lasted for almost 2 hours    Received a dose of IV Lopressor in the ER  Place on p r n  IV Lopressor in case heart rate is more than 130  Digoxin level is normal   Continue home regimen of digoxin 250 mcg in the morning and 125 mcg in the evening  Will replace potassium as it is low at 2 3  Check magnesium level  Monitor on telemetry and consult placed to Cardiology and order 2D echo to evaluate LV function  Patient denies any alcohol or drug abuse or recent stressors  Normally follows with Dr Angela Naylor at [facility]    Lactic acidosis  Assessment & Plan  No acute infection at this time  probably secondary to SVT  Placed on IV fluid boluses and repeat lactic acid ordered    Hypokalemia  Assessment & Plan  Secondary to inadequate oral intake  Denies any recent illness or nausea vomiting or diarrhea  Potassium level is 2 3  Will repeat potassium level again now   Replace potassium IV and oral until it is more than 4  Check magnesium level    Other cirrhosis of liver [facility])  Assessment & Plan  Secondary to cardiac etiology    Stable at this time    Type 2 diabetes mellitus without complication, without long-term current use of insulin [facility])  Assessment & Plan  Lab Results Component Value Date    HGBA1C 6 1 08/11/2018       No results for input(s): POCGLU in the last 72 hours  Blood Sugar Average: Last 72 hrs:   uncontrolled  Continue Jardiance  also placed on insulin sliding scale    VTE Prophylaxis: Pharmacologic VTE Prophylaxis contraindicated due to Low risk  / sequential compression device   Code Status:  Full code  POLST: There is no POLST form on file for this patient (pre-hospital)  Discussion with family:  Discussed with mother at bedside    Anticipated Length of Stay:  Patient will be admitted on an Inpatient basis with an anticipated length of stay of  more than 2 midnights  Justification for Hospital Stay:  Acute SVT    Total Time for Visit, including Counseling / Coordination of Care: 60 minutes  Greater than 50% of this total time spent on direct patient counseling and coordination of care  Chief Complaint:   Palpitations    History of Present Illness:    Ysabel Carl is a 22 y o  female who presents with palpitations  Patient states that she had diarrhea 2 days ago however is doing okay now but also states that she has not been eating and drinking well for the last day or 2  Today while at work sitting at her desk she started experiencing palpitations  Denies any exertional activity during the episode  Episode started around 12 noon and persisted for almost 2 hours  Also had nausea and vomiting with the episode  She normally is compliant with her medications and has known extensive cardiac history and history of SVT since the age of 2  No episodes in the last 6 months  Follow[facility] with Cardiology at [facility]   Amiodarone was discontinued a few months ago and she has been stable since on digoxin along    Review of Systems:    Review of Systems   Constitutional: Positive for appetite change and fatigue  Negative for chills and fever  HENT: Negative for hearing loss, sore throat and trouble swallowing      Eyes: Negative for photophobia, discharge and visual disturbance  Respiratory: Negative for chest tightness and shortness of breath  Cardiovascular: Positive for palpitations  Negative for chest pain  Gastrointestinal: Positive for diarrhea  Negative for abdominal pain, blood in stool and vomiting  Endocrine: Negative for polydipsia and polyuria  Genitourinary: Negative for difficulty urinating, dysuria, flank pain and hematuria  Musculoskeletal: Negative for back pain and gait problem  Skin: Negative for rash  Allergic/Immunologic: Negative for environmental allergies and food allergies  Neurological: Negative for dizziness, seizures, syncope and headaches  Hematological: Does not bruise/bleed easily  Psychiatric/Behavioral: Negative for behavioral problems  All other systems reviewed and are negative  Past Medical and Surgical History:     Past Medical History:   Diagnosis Date    ASD (atrial septal defect)     Cirrhosis of liver (HCC)     Diabetes mellitus (HCC)     IBS (irritable bowel syndrome)     Pulmonary atresia     SVT (supraventricular tachycardia) (HCC)        Past Surgical History:   Procedure Laterality Date    CARDIAC SURGERY         Meds/Allergies:    Prior to Admission medications    Medication Sig Start Date End Date Taking?  Authorizing Provider   Empagliflozin (Jardiance) 10 MG TABS Take 10 mg by mouth every morning   Yes Historical Provider, MD   digoxin (LANOXIN) 0 25 mg tablet Take 250 mcg by mouth every morning  5/13/20   Historical Provider, MD   digoxin (LANOXIN) 0 25 mg tablet Take 125 mcg by mouth every evening     Historical Provider, MD   hydrochlorothiazide (HYDRODIURIL) 12 5 mg tablet Take 12 5 mg by mouth daily     Historical Provider, MD   sertraline (ZOLOFT) 25 mg tablet Take 25 mg by mouth daily as needed     Historical Provider, MD   metFORMIN (GLUCOPHAGE) 500 mg tablet Take 500 mg by mouth 2 (two) times a day with meals  10/2/20 7/28/21  Historical Provider, MD   ondansetron (ZOFRAN) 4 mg tablet Take 1 tablet (4 mg total) by mouth every 6 (six) hours as needed for nausea or vomiting 12/27/20 7/28/21  Fernando Chandler DO     I have reviewed home medications with patient personally  Allergies: Allergies   Allergen Reactions    Procainamide Other (See Comments) and Rash       Social History:     Marital Status: Single   Social History     Substance and Sexual Activity   Alcohol Use Not Currently     Social History     Tobacco Use   Smoking Status Never Smoker   Smokeless Tobacco Never Used     Social History     Substance and Sexual Activity   Drug Use Never       Family History:    Family History   Problem Relation Age of Onset    Diabetes Mother     No Known Problems Father        Physical Exam:     Vitals:   Blood Pressure: 117/62 (07/28/21 1700)  Pulse: 59 (07/28/21 1700)  Temperature: 97 7 °F (36 5 °C) (07/28/21 1541)  Temp Source: Temporal (07/28/21 1541)  Respirations: 16 (07/28/21 1700)  SpO2: 92 % (07/28/21 1700)    Physical Exam  Vitals and nursing note reviewed  Constitutional:       Appearance: Normal appearance  HENT:      Head: Normocephalic and atraumatic  Right Ear: External ear normal       Left Ear: External ear normal       Nose: Nose normal       Mouth/Throat:      Pharynx: Oropharynx is clear  Eyes:      Pupils: Pupils are equal, round, and reactive to light  Cardiovascular:      Rate and Rhythm: Regular rhythm  Tachycardia present  Heart sounds: Normal heart sounds  Pulmonary:      Effort: Pulmonary effort is normal       Breath sounds: Normal breath sounds  Abdominal:      General: Bowel sounds are normal       Palpations: Abdomen is soft  Tenderness: There is no abdominal tenderness  Musculoskeletal:         General: Normal range of motion  Cervical back: Normal range of motion and neck supple  Skin:     General: Skin is warm and dry  Capillary Refill: Capillary refill takes less than 2 seconds     Neurological:      General: No focal deficit present  Mental Status: She is alert and oriented to person, place, and time  Psychiatric:         Mood and Affect: Mood normal              Additional Data:     Lab Results: I have personally reviewed pertinent reports  Results from last 7 days   Lab Units 07/28/21  1606   WBC Thousand/uL 13 87*   HEMOGLOBIN g/dL 15 0   HEMATOCRIT % 45 9   PLATELETS Thousands/uL 326   NEUTROS PCT % 83*   LYMPHS PCT % 12*   MONOS PCT % 4   EOS PCT % 1     Results from last 7 days   Lab Units 07/28/21  1606   SODIUM mmol/L 140   POTASSIUM mmol/L 2 3*   CHLORIDE mmol/L 101   CO2 mmol/L 21   BUN mg/dL 10   CREATININE mg/dL 1 08   ANION GAP mmol/L 18*   CALCIUM mg/dL 9 3   ALBUMIN g/dL 4 1   TOTAL BILIRUBIN mg/dL 0 85   ALK PHOS U/L 130*   ALT U/L 33   AST U/L 23   GLUCOSE RANDOM mg/dL 222*                 Results from last 7 days   Lab Units 07/28/21  1606   LACTIC ACID mmol/L 6 8*       Imaging: I have personally reviewed pertinent reports  XR chest 1 view portable    (Results Pending)       EKG, Pathology, and Other Studies Reviewed on Admission:   · EKG:  SVT    [facility] Yes     ** Please Note: This note has been constructed using a voice recognition system   **

## 2021-07-29 ENCOUNTER — APPOINTMENT (INPATIENT)
Dept: NON INVASIVE DIAGNOSTICS | Facility: HOSPITAL | Age: 26
DRG: 641 | End: 2021-07-29
Payer: COMMERCIAL

## 2021-07-29 VITALS
RESPIRATION RATE: 17 BRPM | SYSTOLIC BLOOD PRESSURE: 126 MMHG | HEART RATE: 74 BPM | DIASTOLIC BLOOD PRESSURE: 72 MMHG | WEIGHT: 223 LBS | HEIGHT: 64 IN | OXYGEN SATURATION: 94 % | BODY MASS INDEX: 38.07 KG/M2 | TEMPERATURE: 97.5 F

## 2021-07-29 LAB
ANION GAP SERPL CALCULATED.3IONS-SCNC: 12 MMOL/L (ref 4–13)
ATRIAL RATE: 100 BPM
ATRIAL RATE: 135 BPM
BACTERIA UR QL AUTO: ABNORMAL /HPF
BILIRUB UR QL STRIP: NEGATIVE
BUN SERPL-MCNC: 8 MG/DL (ref 5–25)
CALCIUM SERPL-MCNC: 8.2 MG/DL (ref 8.3–10.1)
CHLORIDE SERPL-SCNC: 110 MMOL/L (ref 100–108)
CLARITY UR: ABNORMAL
CO2 SERPL-SCNC: 23 MMOL/L (ref 21–32)
COLOR UR: ABNORMAL
CREAT SERPL-MCNC: 0.73 MG/DL (ref 0.6–1.3)
GFR SERPL CREATININE-BSD FRML MDRD: 115 ML/MIN/1.73SQ M
GLUCOSE SERPL-MCNC: 117 MG/DL (ref 65–140)
GLUCOSE SERPL-MCNC: 125 MG/DL (ref 65–140)
GLUCOSE SERPL-MCNC: 142 MG/DL (ref 65–140)
GLUCOSE UR STRIP-MCNC: ABNORMAL MG/DL
HGB UR QL STRIP.AUTO: ABNORMAL
KETONES UR STRIP-MCNC: ABNORMAL MG/DL
LACTATE SERPL-SCNC: 2.1 MMOL/L (ref 0.5–2)
LEUKOCYTE ESTERASE UR QL STRIP: ABNORMAL
MAGNESIUM SERPL-MCNC: 1.9 MG/DL (ref 1.6–2.6)
NITRITE UR QL STRIP: NEGATIVE
NON-SQ EPI CELLS URNS QL MICRO: ABNORMAL /HPF
P AXIS: 55 DEGREES
P AXIS: 71 DEGREES
PH UR STRIP.AUTO: 6.5 [PH]
POTASSIUM SERPL-SCNC: 3.9 MMOL/L (ref 3.5–5.3)
PR INTERVAL: 178 MS
PR INTERVAL: 188 MS
PROT UR STRIP-MCNC: NEGATIVE MG/DL
QRS AXIS: 101 DEGREES
QRS AXIS: 93 DEGREES
QRSD INTERVAL: 150 MS
QRSD INTERVAL: 154 MS
QT INTERVAL: 248 MS
QT INTERVAL: 508 MS
QTC INTERVAL: 372 MS
QTC INTERVAL: 655 MS
RBC #/AREA URNS AUTO: ABNORMAL /HPF
SODIUM SERPL-SCNC: 145 MMOL/L (ref 136–145)
SP GR UR STRIP.AUTO: 1.01 (ref 1–1.03)
T WAVE AXIS: -58 DEGREES
T WAVE AXIS: 71 DEGREES
TSH SERPL DL<=0.05 MIU/L-ACNC: 7.22 UIU/ML (ref 0.36–3.74)
UROBILINOGEN UR QL STRIP.AUTO: 0.2 E.U./DL
VENTRICULAR RATE: 100 BPM
VENTRICULAR RATE: 135 BPM
WBC #/AREA URNS AUTO: ABNORMAL /HPF

## 2021-07-29 PROCEDURE — 83605 ASSAY OF LACTIC ACID: CPT | Performed by: FAMILY MEDICINE

## 2021-07-29 PROCEDURE — 81001 URINALYSIS AUTO W/SCOPE: CPT | Performed by: EMERGENCY MEDICINE

## 2021-07-29 PROCEDURE — 80048 BASIC METABOLIC PNL TOTAL CA: CPT | Performed by: FAMILY MEDICINE

## 2021-07-29 PROCEDURE — 84443 ASSAY THYROID STIM HORMONE: CPT | Performed by: FAMILY MEDICINE

## 2021-07-29 PROCEDURE — 99239 HOSP IP/OBS DSCHRG MGMT >30: CPT | Performed by: FAMILY MEDICINE

## 2021-07-29 PROCEDURE — 93306 TTE W/DOPPLER COMPLETE: CPT

## 2021-07-29 PROCEDURE — 83735 ASSAY OF MAGNESIUM: CPT | Performed by: FAMILY MEDICINE

## 2021-07-29 PROCEDURE — 82948 REAGENT STRIP/BLOOD GLUCOSE: CPT

## 2021-07-29 RX ORDER — POTASSIUM CHLORIDE 750 MG/1
20 CAPSULE, EXTENDED RELEASE ORAL DAILY
Qty: 60 CAPSULE | Refills: 0 | Status: SHIPPED | OUTPATIENT
Start: 2021-07-29 | End: 2021-08-28

## 2021-07-29 RX ADMIN — DIGOXIN 250 MCG: 125 TABLET ORAL at 08:51

## 2021-07-29 RX ADMIN — PERFLUTREN 0.4 ML/MIN: 6.52 INJECTION, SUSPENSION INTRAVENOUS at 10:35

## 2021-07-29 NOTE — ASSESSMENT & PLAN NOTE
No acute infection at this time  probably secondary to SVT    Placed on IV fluid boluses and repeat lactic acid improving

## 2021-07-29 NOTE — ASSESSMENT & PLAN NOTE
Lab Results   Component Value Date    HGBA1C 6 1 08/11/2018       Recent Labs     07/28/21  1820 07/28/21  2105 07/28/21  2215 07/29/21  0738   POCGLU 138 162* 143* 125       Blood Sugar Average: Last 72 hrs:  (P) 142 controlled    Continue Jardiance upon discharge

## 2021-07-29 NOTE — UTILIZATION REVIEW
Initial Clinical Review    Admission: Date/Time/Statement:   Admission Orders (From admission, onward)     Ordered        07/28/21 1716  Inpatient Admission  Once                   Orders Placed This Encounter   Procedures    Inpatient Admission     Standing Status:   Standing     Number of Occurrences:   1     Order Specific Question:   Level of Care     Answer:   Med Surg [16]     Order Specific Question:   Estimated length of stay     Answer:   More than 2 Midnights     Order Specific Question:   Certification     Answer:   I certify that inpatient services are medically necessary for this patient for a duration of greater than two midnights  See H&P and MD Progress Notes for additional information about the patient's course of treatment  ED Arrival Information     Expected Arrival Acuity    - 7/28/2021 15:31 Urgent         Means of arrival Escorted by Service Admission type    Ambulance [facility] Urgent         Arrival complaint    Heart Problems        Chief Complaint   Patient presents with    Rapid Heart Rate     pt has hx of SVT  states she felt she has been in SVT since noon  also reports nausea and vomiting       Initial Presentation:     22 y o  female who presents to the ED from home with palpitations  Today while at work sitting at her desk she started experiencing palpitations  Denies any exertional activity during the episode  Episode started around 12 noon and persisted for almost 2 hour, associated with  nausea and vomiting  Patient had diarrhea two days ago, reports not eating or drinking well for the past two days  She has extensive cardiac history and history of SVT since age 3 and is compliant with her medications  and has known extensive cardiac history and history of SVT since the age of 2  Tera Norris with Cardiology at [facility]  Amiodarone was discontinued a few months ago and she has been stable since on digoxin   Patient also has a history of DM2 and cirrhosis of liver  ED labs revealed elevated lactic acid and hypokalemia  Treated with IV metoprolol in the ED  Plan: Inpatient Med Surg admission for evaluation and treatment of SVT, lactic acidosis and hypokalemia: Telemetry,  IV metoprolol PRN, continue home regimen of digoxin, replace potassium until >4, check magnesium, consult Cardiology  IVF, repeat lactic acid  7/29 Cardiology consult:  Sinus tachycardia, not true SVT on EKG- previously on procainamide and amiodarone, on digoxin   Hx of pulmonary atresia, ASD closure, pulmonary valvotomy  Hx numerous ablations  Lactic acidosis,  Hypokalemia  1  Echo today if normal okay for discharge  2  Avoid hypokalemia as likely cause for symptoms and tachycardia  3  No medication changes given cause is likely hypokalemia  7/29 Internal Medicine: Will discharge home on 20 mEq of potassium daily in addition to her other home medication  Repeat outpatient labs in two weeks  7/29  Discharged to home/self care         ED Triage Vitals   Temperature Pulse Respirations Blood Pressure SpO2   07/28/21 1541 07/28/21 1539 07/28/21 1541 07/28/21 1541 07/28/21 1539   97 7 °F (36 5 °C) (!) 130 20 144/70 94 %      Temp Source Heart Rate Source Patient Position - Orthostatic VS BP Location FiO2 (%)   07/28/21 1541 07/28/21 1539 07/28/21 1539 07/28/21 1541 --   Temporal Monitor Lying Right arm       Pain Score       07/28/21 1539       No Pain          Wt Readings from Last 1 Encounters:   07/28/21 101 kg (223 lb)     Additional Vital Signs:     Date/Time  Temp  Pulse  Resp  BP  MAP (mmHg)  SpO2  O2 Device   07/29/21 07:33:56  97 9 °F (36 6 °C)  79  17  119/76  90  94 %  --   07/29/21 03:30:41  97 4 °F (36 3 °C)Abnormal   71  18  117/61  80  92 %  --   07/28/21 22:58:02  98 1 °F (36 7 °C)  82  18  131/68  89  92 %  --   07/28/21 19:47:12  --  --  19  130/75  93  --  --   07/28/21 18:32:23  97 9 °F (36 6 °C)  95  18  130/76  94  93 %  --   07/28/21 1815  --  102  24  109/58  82  93 %  None (Room air)   07/28/21 1700  --  59  16  117/62  83  92 %  None (Room air)   07/28/21 1615  --  100  18  123/64  --  93 %  None (Room air)   07/28/21 1610  --  111Abnormal   35  136/69  --  94 %  None (Room air)   07/28/21 1607  --  --  --  --  --  --  None (Room air)   07/28/21 1605  --  109Abnormal   20  123/63  --  94 %  None (Room air)         Pertinent Labs/Diagnostic Test Results:     7/29 ECHO:      LEFT VENTRICLE: Size was normal  Systolic function was normal  Ejection fraction was estimated in the range of 55 % to 59 %  There were no regional wall motion abnormalities  Wall thickness was normal  No evidence of apical thrombus  DOPPLER: Left ventricular diastolic function parameters were normal      RIGHT VENTRICLE: The ventricle was mildly to moderately dilated, heavily trabeculated  Systolic function was normal      LEFT ATRIUM: Size was normal      RIGHT ATRIUM: Size was normal      MITRAL VALVE: Valve structure was normal  There was normal leaflet separation  DOPPLER: The transmitral velocity was within the normal range  There was no evidence for stenosis  There was no significant regurgitation      AORTIC VALVE: The valve was not well visualized  DOPPLER: There was no evidence for stenosis  There was no significant regurgitation      TRICUSPID VALVE: DOPPLER: There was no evidence for stenosis  There was mild regurgitation      PULMONIC VALVE: DOPPLER: There was no evidence for stenosis  There was trace regurgitation      PERICARDIUM: There was no pericardial effusion      AORTA: The root exhibited normal size  The ascending aorta was normal in size      SYSTEMIC VEINS: IVC: The inferior vena cava was normal in size  Respirophasic changes were normal     7/28 CXR:  Cardiomegaly   No focal consolidation, pleural effusion, or pneumothorax      7/28 EKG:      Sinus tachycardia  Right bundle branch block  Inferior infarct (cited on or before 27-DEC-2020)  Abnormal ECG  When compared with ECG of 27-DEC-2020 14:44,  No significant change was found          Results from last 7 days   Lab Units 07/28/21  1606   WBC Thousand/uL 13 87*   HEMOGLOBIN g/dL 15 0   HEMATOCRIT % 45 9   PLATELETS Thousands/uL 326   NEUTROS ABS Thousands/µL 11 59*            Results from last 7 days   Lab Units 07/29/21  0452 07/28/21  1821 07/28/21  1606   SODIUM mmol/L 145 143 140   POTASSIUM mmol/L 3 9 2 9* 2 3*   CHLORIDE mmol/L 110* 105 101   CO2 mmol/L 23 22 21   ANION GAP mmol/L 12 16* 18*   BUN mg/dL 8 9 10   CREATININE mg/dL 0 73 0 99 1 08   EGFR ml/min/1 73sq m 115 79 72   CALCIUM mg/dL 8 2* 8 7 9 3   MAGNESIUM mg/dL 1 9  --  1 7   PHOSPHORUS mg/dL  --   --  2 2*     Results from last 7 days   Lab Units 07/28/21  1606   AST U/L 23   ALT U/L 33   ALK PHOS U/L 130*   TOTAL PROTEIN g/dL 8 3*   ALBUMIN g/dL 4 1   TOTAL BILIRUBIN mg/dL 0 85     Results from last 7 days   Lab Units 07/29/21  1054 07/29/21  0738 07/28/21  2215 07/28/21  2105 07/28/21  1820   POC GLUCOSE mg/dl 142* 125 143* 162* 138     Results from last 7 days   Lab Units 07/29/21  0452 07/28/21  1821 07/28/21  1606   GLUCOSE RANDOM mg/dL 117 165* 222*         Results from last 7 days   Lab Units 07/28/21  1606   TROPONIN I ng/mL <0 02     Results from last 7 days   Lab Units 07/28/21  1606   D-DIMER QUANTITATIVE ug/ml FEU 0 44         Results from last 7 days   Lab Units 07/29/21  0452   TSH 3RD GENERATON uIU/mL 7 224*         Results from last 7 days   Lab Units 07/29/21  0046 07/28/21  2222 07/28/21  1821 07/28/21  1606   LACTIC ACID mmol/L 2 1* 4 0* 5 3* 6 8*         Results from last 7 days   Lab Units 07/28/21  1606   DIGOXIN LVL ng/mL 0 9                 Results from last 7 days   Lab Units 07/28/21  1606   LIPASE u/L 126              Results from last 7 days   Lab Units 07/29/21  0624   CLARITY UA  Slightly Cloudy   COLOR UA  Pink   SPEC GRAV UA  1 015   PH UA  6 5   GLUCOSE UA mg/dl >=1000 (1%)*   KETONES UA mg/dl Trace*   BLOOD UA  Large*   PROTEIN UA mg/dl Negative   NITRITE UA  Negative   BILIRUBIN UA  Negative   UROBILINOGEN UA E U /dl 0 2   LEUKOCYTES UA  Elevated glucose may cause decreased leukocyte values  See urine microscopic for [facility] result/*   WBC UA /hpf None Seen   RBC UA /hpf Innumerable*   BACTERIA UA /hpf None Seen   EPITHELIAL CELLS WET PREP /hpf Occasional       ED Treatment:   Medication Administration from 07/28/2021 1531 to 07/28/2021 1830       Date/Time Order Dose Route Action     07/28/2021 1556 sodium chloride 0 9 % bolus 1,000 mL 1,000 mL Intravenous New Bag     07/28/2021 1556 ondansetron (ZOFRAN) injection 4 mg 4 mg Intravenous Given     07/28/2021 1557 metoprolol (LOPRESSOR) injection 5 mg 5 mg Intravenous Given     07/28/2021 1611 metoprolol (LOPRESSOR) injection 5 mg 5 mg Intravenous Given     07/28/2021 1723 potassium chloride (K-DUR,KLOR-CON) CR tablet 40 mEq 40 mEq Oral Given     07/28/2021 1732 potassium chloride 20 mEq IVPB (premix) 20 mEq Intravenous New Bag        Past Medical History:   Diagnosis Date    ASD (atrial septal defect)     Cirrhosis of liver (HCC)     Diabetes mellitus (HCC)     IBS (irritable bowel syndrome)     Pulmonary atresia     SVT (supraventricular tachycardia) (HCC)      Present on Admission:  **None**      Admitting Diagnosis: Hypokalemia [E87 6]  Lactic acidosis [E87 2]  SVT (supraventricular tachycardia) (HCC) [I47 1]  Tachycardia [R00 0]  Age/Sex: 22 y o  female       Admission Orders:    Telemetry, ECHO,       Scheduled Medications:      No current facility-administered medications for this encounter  Continuous IV Infusions:      No current facility-administered medications for this encounter      PRN Meds:      acetaminophen, 650 mg, Oral, Q6H PRN  metoprolol, 2 5 mg, Intravenous, Q6H PRN  ondansetron, 4 mg, Intravenous, Q8H PRN x 1 dose 7/28 @ 2254        IP CONSULT TO CARDIOLOGY    Network Utilization Review Department  ATTENTION: Please call with any questions or concerns to [phone] and carefully listen to the prompts so that you are directed to the right person  All voicemails are confidential   Leeanne List all requests for admission clinical reviews, approved or denied determinations and any other requests to dedicated fax number below belonging to the campus where the patient is receiving treatment   List of dedicated fax numbers for the Facilities:  [address] DENIALS (Administrative/Medical Necessity) [phone]   [address] (Maternity/NICU/Pediatrics) [phone]   [address] [place] [address] [place] [address] [address] [address] Emmanuelle Garduno [address] [facility] [phone]

## 2021-07-29 NOTE — PLAN OF CARE
Problem: Potential for Falls  Goal: Patient will remain free of falls  Description: INTERVENTIONS:  - Educate patient/family on patient safety including physical limitations  - Instruct patient to call for assistance with activity   - Consult OT/PT to assist with strengthening/mobility   - Keep Call bell within reach  - Keep bed low and locked with side rails adjusted as appropriate  - Keep care items and personal belongings within reach  - Initiate and maintain comfort rounds  - Make Fall Risk Sign visible to staff  - Obtain necessary fall risk management equipment  - Apply yellow socks and bracelet for high fall risk patients  - Consider moving patient to room near nurses station  Outcome: Progressing     Problem: PAIN - ADULT  Goal: Verbalizes/displays adequate comfort level or baseline comfort level  Description: Interventions:  - Encourage patient to monitor pain and request assistance  - Assess pain using appropriate pain scale  - Administer analgesics based on type and severity of pain and evaluate response  - Implement non-pharmacological measures as appropriate and evaluate response  - Consider cultural and social influences on pain and pain management  - Notify physician/advanced practitioner if interventions unsuccessful or patient reports new pain  Outcome: Progressing     Problem: INFECTION - ADULT  Goal: Absence or prevention of progression during hospitalization  Description: INTERVENTIONS:  - Assess and monitor for signs and symptoms of infection  - Monitor lab/diagnostic results  - Monitor all insertion sites, i e  indwelling lines, tubes, and drains  - Monitor endotracheal if appropriate and nasal secretions for changes in amount and color  - Maurice appropriate cooling/warming therapies per order  - Administer medications as ordered  - Instruct and encourage patient and family to use good hand hygiene technique  - Identify and instruct in appropriate isolation precautions for identified infection/condition  Outcome: Progressing     Problem: SAFETY ADULT  Goal: Patient will remain free of falls  Description: INTERVENTIONS:  - Educate patient/family on patient safety including physical limitations  - Instruct patient to call for assistance with activity   - Consult OT/PT to assist with strengthening/mobility   - Keep Call bell within reach  - Keep bed low and locked with side rails adjusted as appropriate  - Keep care items and personal belongings within reach  - Initiate and maintain comfort rounds  - Make Fall Risk Sign visible to staff  - Obtain necessary fall risk management equipment  - Apply yellow socks and bracelet for high fall risk patients  - Consider moving patient to room near nurses station  Outcome: Progressing  Goal: Maintain or return to baseline ADL function  Description: INTERVENTIONS:  -  Assess patient's ability to carry out ADLs; assess patient's baseline for ADL function and identify physical deficits which impact ability to perform ADLs (bathing, care of mouth/teeth, toileting, grooming, dressing, etc )  - Assess/evaluate cause of self-care deficits   - Assess range of motion  - Assess patient's mobility; develop plan if impaired  - Assess patient's need for assistive devices and provide as appropriate  - Encourage maximum independence but intervene and supervise when necessary  - Involve family in performance of ADLs  - Assess for home care needs following discharge   - Consider OT consult to assist with ADL evaluation and planning for discharge  - Provide patient education as appropriate  Outcome: Progressing  Goal: Maintains/Returns to pre admission functional level  Description: INTERVENTIONS:  - Perform BMAT or MOVE assessment daily    - Set and communicate daily mobility goal to care team and patient/family/caregiver     - Collaborate with rehabilitation services on mobility goals if consulted  - Out of bed for toileting  - Record patient progress and toleration of activity level   Outcome: Progressing     Problem: DISCHARGE PLANNING  Goal: Discharge to home or other facility with appropriate resources  Description: INTERVENTIONS:  - Identify barriers to discharge w/patient and caregiver  - Arrange for needed discharge resources and transportation as appropriate  - Identify discharge learning needs (meds, wound care, etc )  - Arrange for interpretive services to assist at discharge as needed  - Refer to Case Management Department for coordinating discharge planning if the patient needs post-hospital services based on physician/advanced practitioner order or complex needs related to functional status, cognitive ability, or social support system  Outcome: Progressing     Problem: Knowledge Deficit  Goal: Patient/family/caregiver demonstrates understanding of disease process, treatment plan, medications, and discharge instructions  Description: Complete learning assessment and assess knowledge base    Interventions:  - Provide teaching at level of understanding  - Provide teaching via preferred learning methods  Outcome: Progressing     Problem: DISCHARGE PLANNING - CARE MANAGEMENT  Goal: Discharge to post-acute care or home with appropriate resources  Description: INTERVENTIONS:  - Conduct assessment to determine patient/family and health care team treatment goals, and need for post-acute services based on payer coverage, community resources, and patient preferences, and barriers to discharge  - Address psychosocial, clinical, and financial barriers to discharge as identified in assessment in conjunction with the patient/family and health care team  - Arrange appropriate level of post-acute services according to patients   needs and preference and payer coverage in collaboration with the physician and health care team  - Communicate with and update the patient/family, physician, and health care team regarding progress on the discharge plan  - Arrange appropriate transportation to post-acute venues  Outcome: Progressing     Problem: Nutrition/Hydration-ADULT  Goal: Nutrient/Hydration intake appropriate for improving, restoring or maintaining nutritional needs  Description: Monitor and assess patient's nutrition/hydration status for malnutrition  Collaborate with interdisciplinary team and initiate plan and interventions as ordered  Monitor patient's weight and dietary intake as ordered or per policy  Utilize nutrition screening tool and intervene as necessary  Determine patient's food preferences and provide high-protein, high-caloric foods as appropriate       INTERVENTIONS:  - Monitor oral intake, urinary output, labs, and treatment plans  - Assess nutrition and hydration status and recommend course of action  - Evaluate amount of meals eaten  - Assist patient with eating if necessary   - Allow adequate time for meals  - Recommend/ encourage appropriate diets, oral nutritional supplements, and vitamin/mineral supplements  - Order, calculate, and assess calorie counts as needed  - Recommend, monitor, and adjust tube feedings and TPN/PPN based on assessed needs  - Assess need for intravenous fluids  - Provide specific nutrition/hydration education as appropriate  - Include patient/family/caregiver in decisions related to nutrition  Outcome: Progressing

## 2021-07-29 NOTE — ASSESSMENT & PLAN NOTE
Secondary to inadequate oral intake  Also had episode of nausea and diarrhea 2 days ago and also on HCTZ which may have caused hypokalemia  Potassium level is 2 3 on admission and received replacement following which it improved to 3 9    Will repeat potassium level again outpatient in 2 weeks and place on 20 mEq daily

## 2021-07-29 NOTE — CONSULTS
Consultation - Cardiology   [facility] Rosa MURPHY 22 y o  female MRN: [id number]  Unit/Bed#: -01 Encounter: [id number]    Assessment/Plan     Assessment:  Sinus tachycardia, not true SVT on EKG- previously on procainamide and amiodarone, on digoxin   Hx of pulmonary atresia, ASD closure, pulmonary valvotomy  Hx numerous ablations  Lactic acidosis  Hypokalemia  Dm2  Obesity     Plan:  1  Echo today if normal okay for discharge  2  Avoid hypokalemia as likely cause for symptoms and tachycardia  3  No medication changes given cause is likely hypokalemia  4  Follow up with primary cardiology    History of Present Illness   Physician Requesting Consult: Ovidio Gordillo MD  Reason for Consult / Principal Problem: SVT  HPI: [facility] Rosa MURPHY is a 22y o  year old female with history of pulmonary atresia sp pulmonary valvotomy, ASD closure, SVT sp multiple failed ablations who is here for palpitations  She was found to be in sinus tachycardia  She was given IV lopressor and rates improved  She did not require adenosine as rhythm originally thought to be SVT  Potassium was found to be 2 3 and has since been replaced  She had an echo done this morning  Currently pt is feeling well and has no complaints  Follows with [facility] cardiology  Inpatient consult to Cardiology  Consult performed by: Unique Ashraf PA-C  Consult ordered by: Ovidio Gordillo MD          Review of Systems   Constitutional: Negative for chills, fatigue and unexpected weight change  Respiratory: Negative for chest tightness, shortness of breath and wheezing  Cardiovascular: Positive for palpitations  Negative for chest pain and leg swelling  Gastrointestinal: Negative for nausea  Genitourinary: Negative for difficulty urinating  Musculoskeletal: Negative for arthralgias  Skin: Negative for color change, pallor and rash  Neurological: Negative for dizziness, syncope and light-headedness     Psychiatric/Behavioral: Negative for agitation, behavioral problems and confusion  Historical Information   Past Medical History:   Diagnosis Date    ASD (atrial septal defect)     Cirrhosis of liver (HCC)     Diabetes mellitus (HCC)     IBS (irritable bowel syndrome)     Pulmonary atresia     SVT (supraventricular tachycardia) (HCC)      Past Surgical History:   Procedure Laterality Date    CARDIAC SURGERY       Social History     Substance and Sexual Activity   Alcohol Use Not Currently     Social History     Substance and Sexual Activity   Drug Use Never     E-Cigarette/Vaping    E-Cigarette Use Never User      E-Cigarette/Vaping Substances     Social History     Tobacco Use   Smoking Status Never Smoker   Smokeless Tobacco Never Used     Family History: non-contributory    Meds/Allergies   all current active meds have been reviewed  Allergies   Allergen Reactions    Procainamide Other (See Comments) and Rash       Objective   Vitals: Blood pressure 119/76, pulse 79, temperature 97 9 °F (36 6 °C), resp  rate 17, height 5' 4" (1 626 m), weight 101 kg (223 lb), last menstrual period 07/26/2021, SpO2 94 %  Orthostatic Blood Pressures      Most Recent Value   Blood Pressure  119/76 filed at 07/29/2021 2935   Patient Position - Orthostatic VS  Sitting filed at 07/28/2021 1815          No intake or output data in the 24 hours ending 07/29/21 0819    Invasive Devices     Peripheral Intravenous Line            Peripheral IV 07/28/21 Left Antecubital <1 day    Peripheral IV 07/28/21 Right Hand <1 day                Physical Exam  Vitals reviewed  Constitutional:       Appearance: Normal appearance  HENT:      Head: Normocephalic and atraumatic  Neck:      Comments: - JVD  Cardiovascular:      Rate and Rhythm: Normal rate  Heart sounds: Murmur heard  No gallop  Pulmonary:      Effort: Pulmonary effort is normal       Breath sounds: Normal breath sounds  No wheezing  Abdominal:      Palpations: Abdomen is soft     Musculoskeletal:         General: No swelling or tenderness  Cervical back: Neck supple  Skin:     General: Skin is warm and dry  Capillary Refill: Capillary refill takes less than 2 seconds  Neurological:      General: No focal deficit present  Mental Status: She is alert and oriented to person, place, and time  Psychiatric:         Mood and Affect: Mood normal          Thought Content: Thought content normal          Lab Results:   I have personally reviewed pertinent lab results  CBC with diff:   Results from last 7 days   Lab Units 07/28/21  1606   WBC Thousand/uL 13 87*   RBC Million/uL 5 44*   HEMOGLOBIN g/dL 15 0   HEMATOCRIT % 45 9   MCV fL 84   MCH pg 27 6   MCHC g/dL 32 7   RDW % 12 4   MPV fL 9 2   PLATELETS Thousands/uL 326     CMP:   Results from last 7 days   Lab Units 07/28/21  1821 07/28/21  1606   SODIUM mmol/L 143 140   POTASSIUM mmol/L 2 9* 2 3*   CHLORIDE mmol/L 105 101   CO2 mmol/L 22 21   BUN mg/dL 9 10   CREATININE mg/dL 0 99 1 08   CALCIUM mg/dL 8 7 9 3   AST U/L  --  23   ALT U/L  --  33   ALK PHOS U/L  --  130*   EGFR ml/min/1 73sq m 79 72     Troponin:   0   Lab Value Date/Time    TROPONINI <0 02 07/28/2021 1606     BNP:   Results from last 7 days   Lab Units 07/28/21  1821   POTASSIUM mmol/L 2 9*   CHLORIDE mmol/L 105   CO2 mmol/L 22   BUN mg/dL 9   CREATININE mg/dL 0 99   CALCIUM mg/dL 8 7   EGFR ml/min/1 73sq m 79     Coags:     TSH:   Results from last 7 days   Lab Units 07/29/21  0452   TSH 3RD GENERATON uIU/mL 7 224*     Magnesium:   Results from last 7 days   Lab Units 07/28/21  1606   MAGNESIUM mg/dL 1 7     Imaging: I have personally reviewed pertinent reports      EKG:    Sinus tachycardia  Right bundle branch block  Inferior infarct (cited on or before 27-DEC-2020)  Abnormal ECG  When compared with ECG of 27-DEC-2020 14:44,  No significant change was found  Confirmed by Jaylene Lorenzo ([id number]) on 7/29/2021 8:14:32 AM     Normal sinus rhythm  Right bundle branch block  Possible Inferior infarct (cited on or before 27-DEC-2020)  Abnormal ECG  When compared with ECG of 28-JUL-2021 15:49, (unconfirmed)  No significant change was found  Confirmed by Diana Childress ([id number]) on 7/29/2021 8:08:24 AM    Echo pending

## 2021-07-29 NOTE — ASSESSMENT & PLAN NOTE
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 followed by history of ASD closure, pulmonary valvotomy and shunt placement and history of multiple ablations done  Trial of multiple medications including amiodarone, procainamide etc   Last year taken off amiodarone and kept on digoxin alone  So far has been doing well however today had episode of acute SVT that lasted for almost 2 hours    Received a dose of IV Lopressor in the ER  Place on p r n  IV Lopressor in case heart rate is more than 130  Digoxin level is normal   Continue home regimen of digoxin 250 mcg in the morning and 125 mcg in the evening  Potassium level low on admission down to 2 3 and now improved to 3 9 after replacement   Normal magnesium level  Patient denies any alcohol or drug abuse or recent stressors  Normally follows with Dr Clayton Sabillon at [facility]    SVT has now resolved  Heart rates in the 80s and 90s at rest and go up to 110 with activity  Continue home regimen of digoxin alone  Will place on potassium 20 mEq daily and recheck BMP in 2 weeks    Also recheck thyroid panel in 2 weeks

## 2021-07-29 NOTE — DISCHARGE SUMMARY
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 followed by history of ASD closure, pulmonary valvotomy and shunt placement and history of multiple ablations done  Trial of multiple medications including amiodarone, procainamide etc   Last year taken off amiodarone and kept on digoxin alone  So far has been doing well however today had episode of acute SVT that lasted for almost 2 hours    Received a dose of IV Lopressor in the ER  Place on p r n  IV Lopressor in case heart rate is more than 130  Digoxin level is normal   Continue home regimen of digoxin 250 mcg in the morning and 125 mcg in the evening  Potassium level low on admission down to 2 3 and now improved to 3 9 after replacement   Normal magnesium level  Patient denies any alcohol or drug abuse or recent stressors  Normally follows with Dr Guadalupe Leavitt at [facility]    SVT has now resolved  Heart rates in the 80s and 90s at rest and go up to 110 with activity  Continue home regimen of digoxin alone  Will place on potassium 20 mEq daily and recheck BMP in 2 weeks  Also recheck thyroid panel in 2 weeks    Lactic acidosis  Assessment & Plan  No acute infection at this time  probably secondary to SVT  Placed on IV fluid boluses and repeat lactic acid improving    Hypokalemia  Assessment & Plan  Secondary to inadequate oral intake  Also had episode of nausea and diarrhea 2 days ago and also on HCTZ which may have caused hypokalemia  Potassium level is 2 3 on admission and received replacement following which it improved to 3 9    Will repeat potassium level again outpatient in 2 weeks and place on 20 mEq daily      Other cirrhosis of liver [facility])  Assessment & Plan  Secondary to cardiac etiology  Stable at this time    Type 2 diabetes mellitus without complication, without long-term current use of insulin [facility])  Assessment & Plan  Lab Results   Component Value Date    HGBA1C 6 1 08/11/2018       Recent Labs     07/28/21  1820 07/28/21  2105 07/28/21  2215 07/29/21  0738   POCGLU 138 162* 143* 125       Blood Sugar Average: Last 72 hrs:  (P) 142 controlled  Continue Jardiance upon discharge      Discharging Physician / Practitioner: Rigo Linda MD  PCP: Luis Carlos Ordoñez DO  Admission Date:   Admission Orders (From admission, onward)     Ordered        07/28/21 1716  Inpatient Admission  Once                   Discharge Date: 07/29/21    Medical Problems     Resolved Problems  Date Reviewed: 7/29/2021    None                Consultations During Hospital Stay:  · Cardiology    Procedures Performed:   · None    Significant Findings / Test Results:   XR chest 1 view portable    Result Date: 7/29/2021  Impression: Cardiomegaly  No focal consolidation, pleural effusion, or pneumothorax  Workstation performed: [id number]     Incidental Findings:   · None     Test Results Pending at Discharge (will require follow up): · None     Outpatient Tests Requested:  · TSH, free T4 and BMP in 2 weeks  · Outpatient follow-up with regular cardiologist in 2-3 weeks    Complications:  None    Reason for Admission:  [facility] Course:     Santosh Sevilla is a 22 y o  female patient who originally presented to the hospital on 7/28/2021 due to palpitations  Patient has known history of SVT since she was 3years old  Was found to have acute hypokalemia which may have potentiated the SVT episodes  Potassium was 2 3 on presentation and now received multiple potassium supplementation following which it has improved to 3 9    Digoxin level was normal   Received 1 dose of IV Lopressor in the ER following which she has been better rate controlled  Will discharge home on 20 mEq of potassium daily in addition to her other home medicines and repeat labs outpatient in 2 weeks  The patient, initially admitted to the hospital as inpatient, was discharged earlier than expected given the following: Rapid improvement  Please see above list of diagnoses and related plan for additional information  Condition at Discharge: good     Discharge Day Visit / Exam:     Subjective:  Patient denies any chest pain or shortness of breath or abdominal pain  She states she is feeling better today  Denies any diarrhea and is eating okay  Vitals: Blood Pressure: 119/76 (07/29/21 0733)  Pulse: 79 (07/29/21 0733)  Temperature: 97 9 °F (36 6 °C) (07/29/21 0733)  Temp Source: Oral (07/28/21 2258)  Respirations: 17 (07/29/21 0733)  Height: 5' 4" (162 6 cm) (07/28/21 2010)  Weight - Scale: 101 kg (223 lb) (07/28/21 2010)  SpO2: 94 % (07/29/21 0733)  Exam:   Physical Exam  Vitals and nursing note reviewed  Constitutional:       Appearance: Normal appearance  HENT:      Head: Normocephalic and atraumatic  Right Ear: External ear normal       Left Ear: External ear normal       Nose: Nose normal       Mouth/Throat:      Pharynx: Oropharynx is clear  Cardiovascular:      Rate and Rhythm: Normal rate and regular rhythm  Heart sounds: Normal heart sounds  Pulmonary:      Effort: Pulmonary effort is normal       Breath sounds: Normal breath sounds  Abdominal:      General: Bowel sounds are normal       Palpations: Abdomen is soft  Tenderness: There is no abdominal tenderness  Musculoskeletal:         General: Normal range of motion  Cervical back: Normal range of motion and neck supple  Skin:     General: Skin is warm and dry  Capillary Refill: Capillary refill takes less than 2 seconds  Neurological:      General: No focal deficit present        Mental Status: She is alert and oriented to person, place, and time  Psychiatric:         Mood and Affect: Mood normal          Discussion with Family:  Discussed with mother at bedside    Discharge instructions/Information to patient and family:   See after visit summary for information provided to patient and family  Provisions for Follow-Up Care:  See after visit summary for information related to follow-up care and any pertinent home health orders  Disposition:     Home    For Discharges to [facility] SNF:   · Not Applicable to this Patient - Not Applicable to this Patient    Planned Readmission:  None     Discharge Statement:  I spent 35 minutes discharging the patient  This time was spent on the day of discharge  I had direct contact with the patient on the day of discharge  Greater than 50% of the total time was spent examining patient, answering all patient questions, arranging and discussing plan of care with patient as well as directly providing post-discharge instructions  Additional time then spent on discharge activities  Discharge Medications:  See after visit summary for reconciled discharge medications provided to patient and family        ** Please Note: This note has been constructed using a voice recognition system **

## 2021-07-29 NOTE — PLAN OF CARE
Problem: DISCHARGE PLANNING  Goal: Discharge to home or other facility with appropriate resources  Description: INTERVENTIONS:  - Identify barriers to discharge w/patient and caregiver  - Arrange for needed discharge resources and transportation as appropriate  - Identify discharge learning needs (meds, wound care, etc )  - Arrange for interpretive services to assist at discharge as needed  - Refer to Case Management Department for coordinating discharge planning if the patient needs post-hospital services based on physician/advanced practitioner order or complex needs related to functional status, cognitive ability, or social support system  Outcome: Progressing     Problem: INFECTION - ADULT  Goal: Absence or prevention of progression during hospitalization  Description: INTERVENTIONS:  - Assess and monitor for signs and symptoms of infection  - Monitor lab/diagnostic results  - Monitor all insertion sites, i e  indwelling lines, tubes, and drains  - Monitor endotracheal if appropriate and nasal secretions for changes in amount and color  - [place] appropriate cooling/warming therapies per order  - Administer medications as ordered  - Instruct and encourage patient and family to use good hand hygiene technique  - Identify and instruct in appropriate isolation precautions for identified infection/condition  Outcome: Progressing     Problem: PAIN - ADULT  Goal: Verbalizes/displays adequate comfort level or baseline comfort level  Description: Interventions:  - Encourage patient to monitor pain and request assistance  - Assess pain using appropriate pain scale  - Administer analgesics based on type and severity of pain and evaluate response  - Implement non-pharmacological measures as appropriate and evaluate response  - Consider cultural and social influences on pain and pain management  - Notify physician/advanced practitioner if interventions unsuccessful or patient reports new pain  Outcome: Progressing

## 2021-07-30 NOTE — UTILIZATION REVIEW
Notification of Discharge   This is a Notification of Discharge from our facility [address]  Please be advised that this patient has been discharge from our facility  Below you will find the admission and discharge date and time including the patients disposition  UTILIZATION REVIEW CONTACT:  Bridger Ontiveros  Utilization   Network Utilization Review Department  Phone: [phone] x carefully listen to the prompts  All voicemails are confidential   Email: [email or web address]     PHYSICIAN ADVISORY SERVICES:  FOR MYIB-II-JOTJ REVIEW - MEDICAL NECESSITY DENIAL  Phone: [phone]  Fax: [phone]  Email: [email or web address]     PRESENTATION DATE: 7/28/2021  3:34 PM  OBERVATION ADMISSION DATE:   INPATIENT ADMISSION DATE: 7/28/21  5:16 PM   DISCHARGE DATE: 7/29/2021 12:06 PM  DISPOSITION: Home/Self Care Home/Self Care      IMPORTANT INFORMATION:  Send all requests for admission clinical reviews, approved or denied determinations and any other requests to dedicated fax number below belonging to the campus where the patient is receiving treatment   List of dedicated fax numbers:  [address] [place] [address] DENIALS (Administrative/Medical Necessity) [phone]   [address] [place] [address] (Maternity/NICU/Pediatrics) [phone]   Rashmi Deena [phone]   Félix Esparza [phone]   [facility] [phone]   [facility] [address] [place] [address] [phone]   [facility]  [phone]   [address] [place] [address], [address]  [address] [facility] [address] [facility] [phone]

## 2021-07-30 NOTE — UTILIZATION REVIEW
Inpatient Admission Authorization Request   NOTIFICATION OF INPATIENT ADMISSION/INPATIENT AUTHORIZATION REQUEST   SERVICING FACILITY:   [address] [place] [address]  Hamida Suarez [facility], [address]  Tax ID: [id number]  NPI: [id number]  Place of Service: Inpatient [address]  [address]  Place of Service Code: 24     ATTENDING PROVIDER:  Attending Name and NPI#: Osvaldo Egan Md [[id number]]  Address: Hamida Suarez [address] [place] [address], [facility] [address]ine  Phone: [phone]     UTILIZATION REVIEW CONTACT:  Shailesh Akhtar Utilization   Network Utilization Review Department  Phone: [phone]  Fax [phone]  Email: [email or web address]     PHYSICIAN ADVISORY SERVICES:  FOR DHDT-LF-GWKS REVIEW - MEDICAL NECESSITY DENIAL  Phone: [phone]  Fax: [phone]  Email: [email or web address]     TYPE OF REQUEST:  Inpatient Status     ADMISSION INFORMATION:  ADMISSION DATE/TIME: 7/28/21  5:16 PM  PATIENT DIAGNOSIS CODE/DESCRIPTION:  Hypokalemia [E87 6]  Lactic acidosis [E87 2]  SVT (supraventricular tachycardia) ([address] ) [I47 1]  Tachycardia [R00 0]  DISCHARGE DATE/TIME: 7/29/2021 12:06 PM  DISCHARGE DISPOSITION (IF DISCHARGED): Home/Self Care     IMPORTANT INFORMATION:  Please contact the Shailesh Akhtar directly with any questions or concerns regarding this request  Department voicemails are confidential     Send requests for admission clinical reviews, concurrent reviews, approvals, and administrative denials due to lack of clinical to fax [phone]

## 2021-08-10 ENCOUNTER — APPOINTMENT (OUTPATIENT)
Dept: LAB | Facility: HOSPITAL | Age: 26
End: 2021-08-10
Attending: FAMILY MEDICINE
Payer: COMMERCIAL

## 2021-08-10 DIAGNOSIS — I47.1 SVT (SUPRAVENTRICULAR TACHYCARDIA) (HCC): ICD-10-CM

## 2021-08-10 DIAGNOSIS — E87.6 HYPOKALEMIA: ICD-10-CM

## 2021-08-10 LAB
ANION GAP SERPL CALCULATED.3IONS-SCNC: 8 MMOL/L (ref 4–13)
BUN SERPL-MCNC: 9 MG/DL (ref 5–25)
CALCIUM SERPL-MCNC: 9.1 MG/DL (ref 8.3–10.1)
CHLORIDE SERPL-SCNC: 101 MMOL/L (ref 100–108)
CO2 SERPL-SCNC: 30 MMOL/L (ref 21–32)
CREAT SERPL-MCNC: 0.69 MG/DL (ref 0.6–1.3)
GFR SERPL CREATININE-BSD FRML MDRD: 121 ML/MIN/1.73SQ M
GLUCOSE P FAST SERPL-MCNC: 122 MG/DL (ref 65–99)
POTASSIUM SERPL-SCNC: 3.8 MMOL/L (ref 3.5–5.3)
SODIUM SERPL-SCNC: 139 MMOL/L (ref 136–145)
T4 FREE SERPL-MCNC: 1.22 NG/DL (ref 0.76–1.46)
TSH SERPL DL<=0.05 MIU/L-ACNC: 4.22 UIU/ML (ref 0.36–3.74)

## 2021-08-10 PROCEDURE — 84439 ASSAY OF FREE THYROXINE: CPT

## 2021-08-10 PROCEDURE — 80048 BASIC METABOLIC PNL TOTAL CA: CPT

## 2021-08-10 PROCEDURE — 36415 COLL VENOUS BLD VENIPUNCTURE: CPT

## 2021-08-10 PROCEDURE — 84443 ASSAY THYROID STIM HORMONE: CPT

## 2021-09-01 ENCOUNTER — APPOINTMENT (OUTPATIENT)
Dept: LAB | Facility: HOSPITAL | Age: 26
End: 2021-09-01
Payer: COMMERCIAL

## 2021-09-01 DIAGNOSIS — R94.6 NONSPECIFIC ABNORMAL RESULTS OF THYROID FUNCTION STUDY: ICD-10-CM

## 2021-09-01 DIAGNOSIS — E87.6 HYPOPOTASSEMIA: ICD-10-CM

## 2021-09-01 DIAGNOSIS — E11.649 UNCONTROLLED TYPE 2 DIABETES MELLITUS WITH HYPOGLYCEMIA, UNSPECIFIED HYPOGLYCEMIA COMA STATUS (HCC): ICD-10-CM

## 2021-09-01 LAB
ANION GAP SERPL CALCULATED.3IONS-SCNC: 6 MMOL/L (ref 4–13)
BUN SERPL-MCNC: 11 MG/DL (ref 5–25)
CALCIUM SERPL-MCNC: 8.7 MG/DL (ref 8.3–10.1)
CHLORIDE SERPL-SCNC: 101 MMOL/L (ref 100–108)
CO2 SERPL-SCNC: 30 MMOL/L (ref 21–32)
CREAT SERPL-MCNC: 0.79 MG/DL (ref 0.6–1.3)
EST. AVERAGE GLUCOSE BLD GHB EST-MCNC: 166 MG/DL
GFR SERPL CREATININE-BSD FRML MDRD: 104 ML/MIN/1.73SQ M
GLUCOSE P FAST SERPL-MCNC: 173 MG/DL (ref 65–99)
HBA1C MFR BLD: 7.4 %
POTASSIUM SERPL-SCNC: 3.2 MMOL/L (ref 3.5–5.3)
SODIUM SERPL-SCNC: 137 MMOL/L (ref 136–145)
T4 FREE SERPL-MCNC: 1.13 NG/DL (ref 0.76–1.46)
TSH SERPL DL<=0.05 MIU/L-ACNC: 5.12 UIU/ML (ref 0.36–3.74)

## 2021-09-01 PROCEDURE — 83036 HEMOGLOBIN GLYCOSYLATED A1C: CPT

## 2021-09-01 PROCEDURE — 84443 ASSAY THYROID STIM HORMONE: CPT

## 2021-09-01 PROCEDURE — 36415 COLL VENOUS BLD VENIPUNCTURE: CPT

## 2021-09-01 PROCEDURE — 84439 ASSAY OF FREE THYROXINE: CPT

## 2021-09-01 PROCEDURE — 80048 BASIC METABOLIC PNL TOTAL CA: CPT

## 2021-10-13 ENCOUNTER — APPOINTMENT (OUTPATIENT)
Dept: LAB | Facility: HOSPITAL | Age: 26
End: 2021-10-13
Payer: COMMERCIAL

## 2021-10-13 DIAGNOSIS — E87.6 HYPOPOTASSEMIA: ICD-10-CM

## 2021-10-13 DIAGNOSIS — E11.649 UNCONTROLLED TYPE 2 DIABETES MELLITUS WITH HYPOGLYCEMIA, UNSPECIFIED HYPOGLYCEMIA COMA STATUS (HCC): ICD-10-CM

## 2021-10-13 DIAGNOSIS — E03.9 MYXEDEMA HEART DISEASE: ICD-10-CM

## 2021-10-13 DIAGNOSIS — I51.9 MYXEDEMA HEART DISEASE: ICD-10-CM

## 2021-10-13 LAB
ANION GAP SERPL CALCULATED.3IONS-SCNC: 6 MMOL/L (ref 4–13)
BUN SERPL-MCNC: 11 MG/DL (ref 5–25)
CALCIUM SERPL-MCNC: 9.2 MG/DL (ref 8.3–10.1)
CHLORIDE SERPL-SCNC: 99 MMOL/L (ref 100–108)
CO2 SERPL-SCNC: 33 MMOL/L (ref 21–32)
CREAT SERPL-MCNC: 0.76 MG/DL (ref 0.6–1.3)
GFR SERPL CREATININE-BSD FRML MDRD: 109 ML/MIN/1.73SQ M
GLUCOSE P FAST SERPL-MCNC: 135 MG/DL (ref 65–99)
POTASSIUM SERPL-SCNC: 3.3 MMOL/L (ref 3.5–5.3)
SODIUM SERPL-SCNC: 138 MMOL/L (ref 136–145)
T4 FREE SERPL-MCNC: 1.36 NG/DL (ref 0.76–1.46)
TSH SERPL DL<=0.05 MIU/L-ACNC: 3.28 UIU/ML (ref 0.36–3.74)

## 2021-10-13 PROCEDURE — 84443 ASSAY THYROID STIM HORMONE: CPT

## 2021-10-13 PROCEDURE — 80048 BASIC METABOLIC PNL TOTAL CA: CPT

## 2021-10-13 PROCEDURE — 36415 COLL VENOUS BLD VENIPUNCTURE: CPT

## 2021-10-13 PROCEDURE — 84439 ASSAY OF FREE THYROXINE: CPT

## 2021-11-02 ENCOUNTER — APPOINTMENT (OUTPATIENT)
Dept: LAB | Facility: HOSPITAL | Age: 26
End: 2021-11-02
Payer: COMMERCIAL

## 2021-11-02 DIAGNOSIS — E87.6 HYPOKALEMIA: ICD-10-CM

## 2021-11-02 LAB — POTASSIUM SERPL-SCNC: 3.3 MMOL/L (ref 3.5–5.3)

## 2021-11-02 PROCEDURE — 36415 COLL VENOUS BLD VENIPUNCTURE: CPT

## 2021-11-02 PROCEDURE — 84132 ASSAY OF SERUM POTASSIUM: CPT

## 2021-11-30 ENCOUNTER — APPOINTMENT (OUTPATIENT)
Dept: LAB | Facility: HOSPITAL | Age: 26
End: 2021-11-30
Payer: COMMERCIAL

## 2021-11-30 DIAGNOSIS — E87.6 HYPOPOTASSEMIA: ICD-10-CM

## 2021-11-30 DIAGNOSIS — I50.9 HEART FAILURE, UNSPECIFIED HF CHRONICITY, UNSPECIFIED HEART FAILURE TYPE (HCC): ICD-10-CM

## 2021-11-30 LAB
ANION GAP SERPL CALCULATED.3IONS-SCNC: 6 MMOL/L (ref 4–13)
BUN SERPL-MCNC: 10 MG/DL (ref 5–25)
CALCIUM SERPL-MCNC: 9.3 MG/DL (ref 8.3–10.1)
CHLORIDE SERPL-SCNC: 102 MMOL/L (ref 100–108)
CO2 SERPL-SCNC: 30 MMOL/L (ref 21–32)
CREAT SERPL-MCNC: 0.8 MG/DL (ref 0.6–1.3)
GFR SERPL CREATININE-BSD FRML MDRD: 102 ML/MIN/1.73SQ M
GLUCOSE P FAST SERPL-MCNC: 129 MG/DL (ref 65–99)
NT-PROBNP SERPL-MCNC: 206 PG/ML
POTASSIUM SERPL-SCNC: 3.9 MMOL/L (ref 3.5–5.3)
SODIUM SERPL-SCNC: 138 MMOL/L (ref 136–145)

## 2021-11-30 PROCEDURE — 80048 BASIC METABOLIC PNL TOTAL CA: CPT

## 2021-11-30 PROCEDURE — 36415 COLL VENOUS BLD VENIPUNCTURE: CPT

## 2021-11-30 PROCEDURE — 83880 ASSAY OF NATRIURETIC PEPTIDE: CPT

## 2021-12-21 ENCOUNTER — APPOINTMENT (OUTPATIENT)
Dept: LAB | Facility: HOSPITAL | Age: 26
End: 2021-12-21
Payer: COMMERCIAL

## 2021-12-21 DIAGNOSIS — Q22.0 PULMONARY ATRESIA: ICD-10-CM

## 2021-12-21 DIAGNOSIS — E87.6 HYPOPOTASSEMIA: ICD-10-CM

## 2021-12-21 DIAGNOSIS — I50.9 HEART FAILURE, UNSPECIFIED HF CHRONICITY, UNSPECIFIED HEART FAILURE TYPE (HCC): ICD-10-CM

## 2021-12-21 LAB
AFP-TM SERPL-MCNC: 1.3 NG/ML (ref 0.5–8)
ALBUMIN SERPL BCP-MCNC: 3.8 G/DL (ref 3.5–5)
ALP SERPL-CCNC: 119 U/L (ref 46–116)
ALT SERPL W P-5'-P-CCNC: 33 U/L (ref 12–78)
ANION GAP SERPL CALCULATED.3IONS-SCNC: 10 MMOL/L (ref 4–13)
AST SERPL W P-5'-P-CCNC: 27 U/L (ref 5–45)
BASOPHILS # BLD AUTO: 0.03 THOUSANDS/ΜL (ref 0–0.1)
BASOPHILS NFR BLD AUTO: 1 % (ref 0–1)
BILIRUB SERPL-MCNC: 0.64 MG/DL (ref 0.2–1)
BUN SERPL-MCNC: 14 MG/DL (ref 5–25)
CALCIUM SERPL-MCNC: 9 MG/DL (ref 8.3–10.1)
CHLORIDE SERPL-SCNC: 101 MMOL/L (ref 100–108)
CO2 SERPL-SCNC: 28 MMOL/L (ref 21–32)
CREAT SERPL-MCNC: 0.81 MG/DL (ref 0.6–1.3)
EOSINOPHIL # BLD AUTO: 0.14 THOUSAND/ΜL (ref 0–0.61)
EOSINOPHIL NFR BLD AUTO: 2 % (ref 0–6)
ERYTHROCYTE [DISTWIDTH] IN BLOOD BY AUTOMATED COUNT: 13.3 % (ref 11.6–15.1)
GFR SERPL CREATININE-BSD FRML MDRD: 100 ML/MIN/1.73SQ M
GLUCOSE P FAST SERPL-MCNC: 128 MG/DL (ref 65–99)
HCT VFR BLD AUTO: 46.3 % (ref 34.8–46.1)
HGB BLD-MCNC: 15 G/DL (ref 11.5–15.4)
IMM GRANULOCYTES # BLD AUTO: 0.02 THOUSAND/UL (ref 0–0.2)
IMM GRANULOCYTES NFR BLD AUTO: 0 % (ref 0–2)
LYMPHOCYTES # BLD AUTO: 1.37 THOUSANDS/ΜL (ref 0.6–4.47)
LYMPHOCYTES NFR BLD AUTO: 22 % (ref 14–44)
MCH RBC QN AUTO: 26.8 PG (ref 26.8–34.3)
MCHC RBC AUTO-ENTMCNC: 32.4 G/DL (ref 31.4–37.4)
MCV RBC AUTO: 83 FL (ref 82–98)
MONOCYTES # BLD AUTO: 0.35 THOUSAND/ΜL (ref 0.17–1.22)
MONOCYTES NFR BLD AUTO: 6 % (ref 4–12)
NEUTROPHILS # BLD AUTO: 4.42 THOUSANDS/ΜL (ref 1.85–7.62)
NEUTS SEG NFR BLD AUTO: 69 % (ref 43–75)
NRBC BLD AUTO-RTO: 0 /100 WBCS
PLATELET # BLD AUTO: 238 THOUSANDS/UL (ref 149–390)
PMV BLD AUTO: 9.8 FL (ref 8.9–12.7)
POTASSIUM SERPL-SCNC: 4 MMOL/L (ref 3.5–5.3)
PROT SERPL-MCNC: 8 G/DL (ref 6.4–8.2)
RBC # BLD AUTO: 5.6 MILLION/UL (ref 3.81–5.12)
SODIUM SERPL-SCNC: 139 MMOL/L (ref 136–145)
WBC # BLD AUTO: 6.33 THOUSAND/UL (ref 4.31–10.16)

## 2021-12-21 PROCEDURE — 82105 ALPHA-FETOPROTEIN SERUM: CPT

## 2021-12-21 PROCEDURE — 85025 COMPLETE CBC W/AUTO DIFF WBC: CPT

## 2021-12-21 PROCEDURE — 80053 COMPREHEN METABOLIC PANEL: CPT

## 2021-12-21 PROCEDURE — 36415 COLL VENOUS BLD VENIPUNCTURE: CPT

## 2022-01-11 ENCOUNTER — APPOINTMENT (OUTPATIENT)
Dept: LAB | Facility: HOSPITAL | Age: 27
End: 2022-01-11
Payer: COMMERCIAL

## 2022-01-11 DIAGNOSIS — E03.9 MYXEDEMA HEART DISEASE: ICD-10-CM

## 2022-01-11 DIAGNOSIS — I50.9 HEART FAILURE, UNSPECIFIED HF CHRONICITY, UNSPECIFIED HEART FAILURE TYPE (HCC): ICD-10-CM

## 2022-01-11 DIAGNOSIS — I51.9 MYXEDEMA HEART DISEASE: ICD-10-CM

## 2022-01-11 DIAGNOSIS — E11.649 UNCONTROLLED TYPE 2 DIABETES MELLITUS WITH HYPOGLYCEMIA, UNSPECIFIED HYPOGLYCEMIA COMA STATUS (HCC): Primary | ICD-10-CM

## 2022-01-11 LAB
EST. AVERAGE GLUCOSE BLD GHB EST-MCNC: 148 MG/DL
HBA1C MFR BLD: 6.8 %
NT-PROBNP SERPL-MCNC: 99 PG/ML
T4 FREE SERPL-MCNC: 1.26 NG/DL (ref 0.76–1.46)
TSH SERPL DL<=0.05 MIU/L-ACNC: 2.94 UIU/ML (ref 0.36–3.74)

## 2022-01-11 PROCEDURE — 83880 ASSAY OF NATRIURETIC PEPTIDE: CPT

## 2022-01-11 PROCEDURE — 84439 ASSAY OF FREE THYROXINE: CPT

## 2022-01-11 PROCEDURE — 84443 ASSAY THYROID STIM HORMONE: CPT

## 2022-01-11 PROCEDURE — 83036 HEMOGLOBIN GLYCOSYLATED A1C: CPT

## 2022-01-11 PROCEDURE — 36415 COLL VENOUS BLD VENIPUNCTURE: CPT

## 2022-02-12 ENCOUNTER — OFFICE VISIT (OUTPATIENT)
Dept: URGENT CARE | Facility: CLINIC | Age: 27
End: 2022-02-12
Payer: COMMERCIAL

## 2022-02-12 VITALS
RESPIRATION RATE: 18 BRPM | SYSTOLIC BLOOD PRESSURE: 148 MMHG | BODY MASS INDEX: 29.71 KG/M2 | HEIGHT: 64 IN | HEART RATE: 66 BPM | DIASTOLIC BLOOD PRESSURE: 73 MMHG | TEMPERATURE: 97.3 F | OXYGEN SATURATION: 98 % | WEIGHT: 174 LBS

## 2022-02-12 DIAGNOSIS — L25.9 CONTACT DERMATITIS, UNSPECIFIED CONTACT DERMATITIS TYPE, UNSPECIFIED TRIGGER: Primary | ICD-10-CM

## 2022-02-12 PROCEDURE — 99213 OFFICE O/P EST LOW 20 MIN: CPT

## 2022-02-12 RX ORDER — SPIRONOLACTONE 25 MG/1
25 TABLET ORAL DAILY
COMMUNITY
Start: 2021-10-18

## 2022-02-12 NOTE — PATIENT INSTRUCTIONS
Use a gentle hand wash such as yael or Mustela  Use Aveeno or Aquaphor for moisture  Avoid cleaning chemicals with out using gloves  Follow up with your PCP  Go to ED for worsening symptoms  Contact Dermatitis   WHAT YOU NEED TO KNOW:   Contact dermatitis is a skin rash  It develops when you touch something that irritates your skin or causes an allergic reaction  DISCHARGE INSTRUCTIONS:   Call your local emergency number (911 in the [address]) if:   · You have sudden trouble breathing  · Your throat swells and you have trouble eating  · Your face is swollen  Call your doctor or dermatologist if:   · You have a fever  · Your blisters are draining pus  · Your rash spreads or does not get better, even after treatment  · You have questions or concerns about your condition or care  Medicines:   · Medicines  help decrease itching and swelling  They will be given as a topical medicine to apply to your rash or as a pill  · Take your medicine as directed  Contact your healthcare provider if you think your medicine is not helping or if you have side effects  Tell him or her if you are allergic to any medicine  Keep a list of the medicines, vitamins, and herbs you take  Include the amounts, and when and why you take them  Bring the list or the pill bottles to follow-up visits  Carry your medicine list with you in case of an emergency  Manage contact dermatitis:   · Take short baths or showers in cool water  Use mild soap or soap-free cleansers  Add oatmeal, baking soda, or cornstarch to the bath water to help decrease skin irritation  · Avoid skin irritants , such as makeup, hair products, soaps, and cleansers  Use products that do not contain perfume or dye  · Apply a cool compress to your rash  This will help soothe your skin  · Apply lotions or creams to the area  These help keep your skin moist and decrease itching   Apply the lotion or cream right after a lukewarm bath or shower when your skin is still damp  Use products that do not contain a scent  Follow up with your doctor or dermatologist in 2 to 3 days:  Write down your questions so you remember to ask them during your visits  © Copyright [address] 2021 Information is for End User's use only and may not be sold, redistributed or otherwise used for commercial purposes  All illustrations and images included in CareNotes® are the copyrighted property of A D A M , Inc  or [facility] Marcel Matthew   The above information is an  only  It is not intended as medical advice for individual conditions or treatments  Talk to your doctor, nurse or pharmacist before following any medical regimen to see if it is safe and effective for you

## 2022-02-12 NOTE — PROGRESS NOTES
[facility]        NAME: Darlene Hair is a 32 y o  female  : 1995    MRN: [id number]  DATE: 2022  TIME: 12:04 PM    Assessment and Plan   Contact dermatitis, unspecified contact dermatitis type, unspecified trigger [L25 9]  1  Contact dermatitis, unspecified contact dermatitis type, unspecified trigger           Patient Instructions     Use a gentle hand wash such as yael or Mustela  Use Aveeno or Aquaphor for moisture  Avoid cleaning chemicals with out using gloves  Follow up with your PCP  Go to ED for worsening symptoms  Chief Complaint     Chief Complaint   Patient presents with    Rash     Hives on both hands started tuesday with bumps all over and itchy          History of Present Illness       Patient reports rash to bilateral hands that started Tuesday  She reports intermittent itchiness  She denies changes in soaps, lotions, detergents, etc   She does report exposure to cleaning chemicals and reports typically wearing gloves  Review of Systems   Review of Systems   Constitutional: Negative for chills and fever  Respiratory: Negative for cough and shortness of breath  Cardiovascular: Negative for chest pain  Skin: Positive for rash  All other systems reviewed and are negative          Current Medications       Current Outpatient Medications:     spironolactone (ALDACTONE) 25 mg tablet, Take 25 mg by mouth daily, Disp: , Rfl:     digoxin (LANOXIN) 0 25 mg tablet, Take 250 mcg by mouth every morning , Disp: , Rfl:     digoxin (LANOXIN) 0 25 mg tablet, Take 125 mcg by mouth every evening , Disp: , Rfl:     Empagliflozin (Jardiance) 10 MG TABS, Take 10 mg by mouth every morning, Disp: , Rfl:     hydrochlorothiazide (HYDRODIURIL) 12 5 mg tablet, Take 12 5 mg by mouth daily , Disp: , Rfl:     potassium chloride (MICRO-K) 10 MEQ CR capsule, Take 2 capsules (20 mEq total) by mouth daily, Disp: 60 capsule, Rfl: 0    sertraline (ZOLOFT) 25 mg tablet, Take 25 mg by mouth daily as needed , Disp: , Rfl:     Current Allergies     Allergies as of 02/12/2022 - Reviewed 02/12/2022   Allergen Reaction Noted    Cholestatin Other (See Comments) and Hives 02/12/2022    Procainamide Other (See Comments) and Rash 10/16/2010            The following portions of the patient's history were reviewed and updated as appropriate: allergies, current medications, past family history, past medical history, past social history, past surgical history and problem list      Past Medical History:   Diagnosis Date    ASD (atrial septal defect)     [facility]     Diabetes mellitus [facility]     IBS (irritable bowel syndrome)     Pulmonary atresia     [facility])        Past Surgical History:   Procedure Laterality Date    CARDIAC SURGERY         Family History   Problem Relation Age of Onset    Diabetes Mother     No Known Problems Father          Medications have been verified  Objective   /73   Pulse 66   Temp (!) 97 3 °F (36 3 °C)   Resp 18   Ht 5' 4" (1 626 m)   Wt 78 9 kg (174 lb)   LMP 02/04/2022   SpO2 98%   BMI 29 87 kg/m²        Physical Exam     Physical Exam  Vitals and nursing note reviewed  Constitutional:       General: She is not in acute distress  Appearance: Normal appearance  She is not toxic-appearing  HENT:      Head: Normocephalic and atraumatic  Right Ear: External ear normal       Left Ear: External ear normal    Eyes:      General: No scleral icterus  Right eye: No discharge  Left eye: No discharge  Conjunctiva/sclera: Conjunctivae normal    Cardiovascular:      Rate and Rhythm: Normal rate  Pulmonary:      Effort: Pulmonary effort is normal    Musculoskeletal:         General: Normal range of motion  Cervical back: Normal range of motion  Skin:     General: Skin is warm and dry  Findings: Rash present        Comments: Erythematous papular rash to bilateral hands   Neurological:      General: No focal deficit present  Mental Status: She is alert and oriented to person, place, and time     Psychiatric:         Mood and Affect: Mood normal          Behavior: Behavior normal

## 2022-02-24 ENCOUNTER — HOSPITAL ENCOUNTER (OUTPATIENT)
Dept: ULTRASOUND IMAGING | Facility: HOSPITAL | Age: 27
Discharge: HOME | End: 2022-02-24
Payer: COMMERCIAL

## 2022-02-24 DIAGNOSIS — K76.1 CHRONIC PASSIVE HEPATIC CONGESTION: ICD-10-CM

## 2022-02-24 PROCEDURE — 76700 US EXAM ABDOM COMPLETE: CPT

## 2022-03-09 ENCOUNTER — HOSPITAL ENCOUNTER (OUTPATIENT)
Dept: MRI IMAGING | Facility: HOSPITAL | Age: 27
Discharge: HOME | End: 2022-03-09
Payer: COMMERCIAL

## 2022-03-09 DIAGNOSIS — K76.1 CHRONIC PASSIVE HEPATIC CONGESTION: ICD-10-CM

## 2022-03-09 PROCEDURE — 74183 MRI ABD W/O CNTR FLWD CNTR: CPT

## 2022-03-09 PROCEDURE — A9585 GADOBUTROL INJECTION: HCPCS | Performed by: RADIOLOGY

## 2022-03-09 RX ADMIN — GADOBUTROL 8 ML: 604.72 INJECTION INTRAVENOUS at 15:44

## 2022-04-27 ENCOUNTER — HOSPITAL ENCOUNTER (OUTPATIENT)
Dept: RADIOLOGY | Facility: CLINIC | Age: 27
Discharge: HOME | End: 2022-04-27
Payer: COMMERCIAL

## 2022-04-27 VITALS — WEIGHT: 174 LBS | BODY MASS INDEX: 29.71 KG/M2 | HEIGHT: 64 IN

## 2022-04-27 DIAGNOSIS — N63.10 LUMP OF RIGHT BREAST: ICD-10-CM

## 2022-04-27 PROCEDURE — 77065 DX MAMMO INCL CAD UNI: CPT

## 2022-04-27 PROCEDURE — 76642 ULTRASOUND BREAST LIMITED: CPT

## 2022-04-27 PROCEDURE — G0279 TOMOSYNTHESIS, MAMMO: HCPCS

## 2022-05-11 ENCOUNTER — APPOINTMENT (OUTPATIENT)
Dept: LAB | Facility: HOSPITAL | Age: 27
End: 2022-05-11
Payer: COMMERCIAL

## 2022-05-11 DIAGNOSIS — E11.65 TYPE II DIABETES MELLITUS WITH HYPEROSMOLARITY, UNCONTROLLED (HCC): ICD-10-CM

## 2022-05-11 DIAGNOSIS — E11.00 TYPE II DIABETES MELLITUS WITH HYPEROSMOLARITY, UNCONTROLLED (HCC): ICD-10-CM

## 2022-05-11 LAB
EST. AVERAGE GLUCOSE BLD GHB EST-MCNC: 114 MG/DL
HBA1C MFR BLD: 5.6 %

## 2022-05-11 PROCEDURE — 83036 HEMOGLOBIN GLYCOSYLATED A1C: CPT

## 2022-05-11 PROCEDURE — 36415 COLL VENOUS BLD VENIPUNCTURE: CPT

## 2022-06-14 ENCOUNTER — APPOINTMENT (OUTPATIENT)
Dept: LAB | Facility: HOSPITAL | Age: 27
End: 2022-06-14
Payer: COMMERCIAL

## 2022-06-14 DIAGNOSIS — R94.5 NONSPECIFIC ABNORMAL RESULTS OF LIVER FUNCTION STUDY: ICD-10-CM

## 2022-06-14 LAB
ALBUMIN SERPL BCP-MCNC: 3.7 G/DL (ref 3.5–5)
ALP SERPL-CCNC: 112 U/L (ref 46–116)
ALT SERPL W P-5'-P-CCNC: 33 U/L (ref 12–78)
ANION GAP SERPL CALCULATED.3IONS-SCNC: 7 MMOL/L (ref 4–13)
AST SERPL W P-5'-P-CCNC: 23 U/L (ref 5–45)
BILIRUB SERPL-MCNC: 0.59 MG/DL (ref 0.2–1)
BUN SERPL-MCNC: 13 MG/DL (ref 5–25)
CALCIUM SERPL-MCNC: 8.7 MG/DL (ref 8.3–10.1)
CHLORIDE SERPL-SCNC: 105 MMOL/L (ref 100–108)
CO2 SERPL-SCNC: 28 MMOL/L (ref 21–32)
CREAT SERPL-MCNC: 0.8 MG/DL (ref 0.6–1.3)
GFR SERPL CREATININE-BSD FRML MDRD: 102 ML/MIN/1.73SQ M
GLUCOSE P FAST SERPL-MCNC: 98 MG/DL (ref 65–99)
POTASSIUM SERPL-SCNC: 4.1 MMOL/L (ref 3.5–5.3)
PROT SERPL-MCNC: 7 G/DL (ref 6.4–8.2)
SODIUM SERPL-SCNC: 140 MMOL/L (ref 136–145)

## 2022-06-14 PROCEDURE — 36415 COLL VENOUS BLD VENIPUNCTURE: CPT

## 2022-06-14 PROCEDURE — 80053 COMPREHEN METABOLIC PANEL: CPT

## 2022-09-21 ENCOUNTER — APPOINTMENT (OUTPATIENT)
Dept: LAB | Facility: HOSPITAL | Age: 27
End: 2022-09-21
Payer: COMMERCIAL

## 2022-09-21 DIAGNOSIS — E11.00 TYPE II DIABETES MELLITUS WITH HYPEROSMOLARITY, UNCONTROLLED (HCC): Primary | ICD-10-CM

## 2022-09-21 DIAGNOSIS — E03.9 MYXEDEMA HEART DISEASE: ICD-10-CM

## 2022-09-21 DIAGNOSIS — I51.9 MYXEDEMA HEART DISEASE: ICD-10-CM

## 2022-09-21 DIAGNOSIS — E11.65 TYPE II DIABETES MELLITUS WITH HYPEROSMOLARITY, UNCONTROLLED (HCC): Primary | ICD-10-CM

## 2022-09-21 LAB
ALBUMIN SERPL BCP-MCNC: 3.9 G/DL (ref 3.5–5)
ALP SERPL-CCNC: 94 U/L (ref 46–116)
ALT SERPL W P-5'-P-CCNC: 35 U/L (ref 12–78)
ANION GAP SERPL CALCULATED.3IONS-SCNC: 6 MMOL/L (ref 4–13)
AST SERPL W P-5'-P-CCNC: 22 U/L (ref 5–45)
BASOPHILS # BLD AUTO: 0.03 THOUSANDS/ΜL (ref 0–0.1)
BASOPHILS NFR BLD AUTO: 0 % (ref 0–1)
BILIRUB SERPL-MCNC: 0.66 MG/DL (ref 0.2–1)
BUN SERPL-MCNC: 14 MG/DL (ref 5–25)
CALCIUM SERPL-MCNC: 9.1 MG/DL (ref 8.3–10.1)
CHLORIDE SERPL-SCNC: 102 MMOL/L (ref 96–108)
CHOLEST SERPL-MCNC: 179 MG/DL
CO2 SERPL-SCNC: 29 MMOL/L (ref 21–32)
CREAT SERPL-MCNC: 0.77 MG/DL (ref 0.6–1.3)
CREAT UR-MCNC: 78.4 MG/DL
EOSINOPHIL # BLD AUTO: 0.27 THOUSAND/ΜL (ref 0–0.61)
EOSINOPHIL NFR BLD AUTO: 4 % (ref 0–6)
ERYTHROCYTE [DISTWIDTH] IN BLOOD BY AUTOMATED COUNT: 12 % (ref 11.6–15.1)
EST. AVERAGE GLUCOSE BLD GHB EST-MCNC: 111 MG/DL
GFR SERPL CREATININE-BSD FRML MDRD: 106 ML/MIN/1.73SQ M
GLUCOSE P FAST SERPL-MCNC: 99 MG/DL (ref 65–99)
HBA1C MFR BLD: 5.5 %
HCT VFR BLD AUTO: 42.2 % (ref 34.8–46.1)
HDLC SERPL-MCNC: 70 MG/DL
HGB BLD-MCNC: 14.2 G/DL (ref 11.5–15.4)
IMM GRANULOCYTES # BLD AUTO: 0.02 THOUSAND/UL (ref 0–0.2)
IMM GRANULOCYTES NFR BLD AUTO: 0 % (ref 0–2)
LDLC SERPL CALC-MCNC: 91 MG/DL (ref 0–100)
LYMPHOCYTES # BLD AUTO: 1.66 THOUSANDS/ΜL (ref 0.6–4.47)
LYMPHOCYTES NFR BLD AUTO: 23 % (ref 14–44)
MCH RBC QN AUTO: 28.6 PG (ref 26.8–34.3)
MCHC RBC AUTO-ENTMCNC: 33.6 G/DL (ref 31.4–37.4)
MCV RBC AUTO: 85 FL (ref 82–98)
MICROALBUMIN UR-MCNC: 7.8 MG/L (ref 0–20)
MICROALBUMIN/CREAT 24H UR: 10 MG/G CREATININE (ref 0–30)
MONOCYTES # BLD AUTO: 0.36 THOUSAND/ΜL (ref 0.17–1.22)
MONOCYTES NFR BLD AUTO: 5 % (ref 4–12)
NEUTROPHILS # BLD AUTO: 5.03 THOUSANDS/ΜL (ref 1.85–7.62)
NEUTS SEG NFR BLD AUTO: 68 % (ref 43–75)
NONHDLC SERPL-MCNC: 109 MG/DL
NRBC BLD AUTO-RTO: 0 /100 WBCS
PLATELET # BLD AUTO: 202 THOUSANDS/UL (ref 149–390)
PMV BLD AUTO: 8.6 FL (ref 8.9–12.7)
POTASSIUM SERPL-SCNC: 4.3 MMOL/L (ref 3.5–5.3)
PROT SERPL-MCNC: 7.4 G/DL (ref 6.4–8.4)
RBC # BLD AUTO: 4.97 MILLION/UL (ref 3.81–5.12)
SODIUM SERPL-SCNC: 137 MMOL/L (ref 135–147)
T4 FREE SERPL-MCNC: 1.06 NG/DL (ref 0.76–1.46)
TRIGL SERPL-MCNC: 91 MG/DL
TSH SERPL DL<=0.05 MIU/L-ACNC: 4.02 UIU/ML (ref 0.45–4.5)
WBC # BLD AUTO: 7.37 THOUSAND/UL (ref 4.31–10.16)

## 2022-09-21 PROCEDURE — 82043 UR ALBUMIN QUANTITATIVE: CPT

## 2022-09-21 PROCEDURE — 82570 ASSAY OF URINE CREATININE: CPT

## 2022-09-21 PROCEDURE — 84443 ASSAY THYROID STIM HORMONE: CPT

## 2022-09-21 PROCEDURE — 36415 COLL VENOUS BLD VENIPUNCTURE: CPT

## 2022-09-21 PROCEDURE — 83036 HEMOGLOBIN GLYCOSYLATED A1C: CPT

## 2022-09-21 PROCEDURE — 80053 COMPREHEN METABOLIC PANEL: CPT

## 2022-09-21 PROCEDURE — 80061 LIPID PANEL: CPT

## 2022-09-21 PROCEDURE — 85025 COMPLETE CBC W/AUTO DIFF WBC: CPT

## 2022-09-21 PROCEDURE — 84439 ASSAY OF FREE THYROXINE: CPT

## 2022-09-28 ENCOUNTER — HOSPITAL ENCOUNTER (OUTPATIENT)
Dept: ULTRASOUND IMAGING | Facility: HOSPITAL | Age: 27
Discharge: HOME | End: 2022-09-28
Payer: COMMERCIAL

## 2022-09-28 DIAGNOSIS — R22.2 LOCALIZED SWELLING, MASS AND LUMP, TRUNK: ICD-10-CM

## 2022-09-28 PROCEDURE — 76705 ECHO EXAM OF ABDOMEN: CPT

## 2023-03-21 ENCOUNTER — HOSPITAL ENCOUNTER (OUTPATIENT)
Dept: MRI IMAGING | Facility: HOSPITAL | Age: 28
Discharge: HOME | End: 2023-03-21

## 2023-03-21 DIAGNOSIS — K76.1 CHRONIC PASSIVE CONGESTION OF LIVER: ICD-10-CM

## 2023-03-21 RX ADMIN — GADOBUTROL 7 ML: 604.72 INJECTION INTRAVENOUS at 14:57

## 2023-03-28 ENCOUNTER — APPOINTMENT (OUTPATIENT)
Dept: LAB | Facility: HOSPITAL | Age: 28
End: 2023-03-28

## 2023-03-28 DIAGNOSIS — K76.1 CHRONIC PASSIVE CONGESTION OF LIVER: ICD-10-CM

## 2023-03-28 LAB
AFP-TM SERPL-MCNC: 1.1 NG/ML (ref 0.5–8)
ALBUMIN SERPL BCP-MCNC: 4.5 G/DL (ref 3.5–5)
ALP SERPL-CCNC: 49 U/L (ref 34–104)
ALT SERPL W P-5'-P-CCNC: 19 U/L (ref 7–52)
ANION GAP SERPL CALCULATED.3IONS-SCNC: 7 MMOL/L (ref 4–13)
APTT PPP: 24 SECONDS (ref 23–37)
AST SERPL W P-5'-P-CCNC: 17 U/L (ref 13–39)
BASOPHILS # BLD AUTO: 0.02 THOUSANDS/ÂΜL (ref 0–0.1)
BASOPHILS NFR BLD AUTO: 0 % (ref 0–1)
BILIRUB DIRECT SERPL-MCNC: 0.11 MG/DL (ref 0–0.2)
BILIRUB SERPL-MCNC: 0.6 MG/DL (ref 0.2–1)
BUN SERPL-MCNC: 11 MG/DL (ref 5–25)
CALCIUM SERPL-MCNC: 9.5 MG/DL (ref 8.4–10.2)
CHLORIDE SERPL-SCNC: 107 MMOL/L (ref 96–108)
CO2 SERPL-SCNC: 27 MMOL/L (ref 21–32)
CREAT SERPL-MCNC: 0.85 MG/DL (ref 0.6–1.3)
EOSINOPHIL # BLD AUTO: 0.1 THOUSAND/ÂΜL (ref 0–0.61)
EOSINOPHIL NFR BLD AUTO: 2 % (ref 0–6)
ERYTHROCYTE [DISTWIDTH] IN BLOOD BY AUTOMATED COUNT: 12 % (ref 11.6–15.1)
GFR SERPL CREATININE-BSD FRML MDRD: 94 ML/MIN/1.73SQ M
GLUCOSE P FAST SERPL-MCNC: 125 MG/DL (ref 65–99)
HCT VFR BLD AUTO: 43.1 % (ref 34.8–46.1)
HGB BLD-MCNC: 14.4 G/DL (ref 11.5–15.4)
IMM GRANULOCYTES # BLD AUTO: 0.01 THOUSAND/UL (ref 0–0.2)
IMM GRANULOCYTES NFR BLD AUTO: 0 % (ref 0–2)
INR PPP: 1.02 (ref 0.84–1.19)
LYMPHOCYTES # BLD AUTO: 1.27 THOUSANDS/ÂΜL (ref 0.6–4.47)
LYMPHOCYTES NFR BLD AUTO: 20 % (ref 14–44)
MCH RBC QN AUTO: 28.9 PG (ref 26.8–34.3)
MCHC RBC AUTO-ENTMCNC: 33.4 G/DL (ref 31.4–37.4)
MCV RBC AUTO: 87 FL (ref 82–98)
MONOCYTES # BLD AUTO: 0.34 THOUSAND/ÂΜL (ref 0.17–1.22)
MONOCYTES NFR BLD AUTO: 5 % (ref 4–12)
NEUTROPHILS # BLD AUTO: 4.52 THOUSANDS/ÂΜL (ref 1.85–7.62)
NEUTS SEG NFR BLD AUTO: 73 % (ref 43–75)
NRBC BLD AUTO-RTO: 0 /100 WBCS
PLATELET # BLD AUTO: 208 THOUSANDS/UL (ref 149–390)
PMV BLD AUTO: 9.5 FL (ref 8.9–12.7)
POTASSIUM SERPL-SCNC: 3.6 MMOL/L (ref 3.5–5.3)
PROT SERPL-MCNC: 7 G/DL (ref 6.4–8.4)
PROTHROMBIN TIME: 13.5 SECONDS (ref 11.6–14.5)
RBC # BLD AUTO: 4.98 MILLION/UL (ref 3.81–5.12)
SODIUM SERPL-SCNC: 141 MMOL/L (ref 135–147)
WBC # BLD AUTO: 6.26 THOUSAND/UL (ref 4.31–10.16)

## 2023-05-06 ENCOUNTER — OFFICE VISIT (OUTPATIENT)
Dept: URGENT CARE | Facility: CLINIC | Age: 28
End: 2023-05-06

## 2023-05-06 VITALS
TEMPERATURE: 98.4 F | DIASTOLIC BLOOD PRESSURE: 74 MMHG | SYSTOLIC BLOOD PRESSURE: 178 MMHG | HEIGHT: 64 IN | HEART RATE: 93 BPM | RESPIRATION RATE: 18 BRPM | OXYGEN SATURATION: 96 % | BODY MASS INDEX: 29.88 KG/M2 | WEIGHT: 175 LBS

## 2023-05-06 DIAGNOSIS — L03.011 PARONYCHIA OF FINGER, RIGHT: Primary | ICD-10-CM

## 2023-05-06 RX ORDER — AMOXICILLIN AND CLAVULANATE POTASSIUM 875; 125 MG/1; MG/1
1 TABLET, FILM COATED ORAL EVERY 12 HOURS SCHEDULED
Qty: 20 TABLET | Refills: 0 | Status: SHIPPED | OUTPATIENT
Start: 2023-05-06 | End: 2023-05-16

## 2023-05-06 NOTE — PROGRESS NOTES
[facility]        NAME: Kristen Page is a 32 y o  female  : 1995    MRN: [id number]  DATE: May 6, 2023  TIME: 1:55 PM    Assessment and Plan   Paronychia of finger, right [L03 011]  1  Paronychia of finger, right  amoxicillin-clavulanate (AUGMENTIN) 875-125 mg per tablet        Patient Instructions     Take antibiotic as prescribed  Warm compresses/soaks a few times a day  Follow up with PCP in 3-5 days  Proceed to  ER if symptoms worsen  Chief Complaint     Chief Complaint   Patient presents with    Finger Swelling     Pt reports right pointer finger swelling and redness around nail since 23     History of Present Illness       25yo F presents c/o pain, redness and swelling surrounding right pointer finger nail  Review of Systems   Review of Systems   Constitutional: Negative for chills, diaphoresis, fatigue and fever  Respiratory: Negative for cough, chest tightness, shortness of breath and wheezing  Cardiovascular: Negative for chest pain  Musculoskeletal: Positive for myalgias       Current Medications       Current Outpatient Medications:     amoxicillin-clavulanate (AUGMENTIN) 875-125 mg per tablet, Take 1 tablet by mouth every 12 (twelve) hours for 10 days, Disp: 20 tablet, Rfl: 0    digoxin (LANOXIN) 0 25 mg tablet, Take 250 mcg by mouth every morning , Disp: , Rfl:     digoxin (LANOXIN) 0 25 mg tablet, Take 125 mcg by mouth every evening , Disp: , Rfl:     Empagliflozin (Jardiance) 10 MG TABS, Take 10 mg by mouth every morning, Disp: , Rfl:     hydrochlorothiazide (HYDRODIURIL) 12 5 mg tablet, Take 12 5 mg by mouth daily  (Patient not taking: Reported on 2023), Disp: , Rfl:     potassium chloride (MICRO-K) 10 MEQ CR capsule, Take 2 capsules (20 mEq total) by mouth daily, Disp: 60 capsule, Rfl: 0    sertraline (ZOLOFT) 25 mg tablet, Take 25 mg by mouth daily as needed , Disp: , Rfl:     spironolactone (ALDACTONE) 25 mg tablet, Take 25 mg by mouth "daily, Disp: , Rfl:     Current Allergies     Allergies as of 05/06/2023 - Reviewed 05/06/2023   Allergen Reaction Noted    Cholestatin Other (See Comments) and Hives 02/12/2022    Procainamide Other (See Comments) and Rash 10/16/2010            The following portions of the patient's history were reviewed and updated as appropriate: allergies, current medications, past family history, past medical history, past social history, past surgical history and problem list      Past Medical History:   Diagnosis Date    ASD (atrial septal defect)     [facility]     Diabetes mellitus [facility]     IBS (irritable bowel syndrome)     Pulmonary atresia     SVT [facility]supra[facility]ricular tachycardia) ([facility])        Past Surgical History:   Procedure Laterality Date    CARDIAC SURGERY         Family History   Problem Relation Age of Onset    Diabetes Mother     No Known Problems Father     No Known Problems Maternal Grandmother     No Known Problems Paternal Grandmother     Lymphoma Maternal Grandfather         non-Hodgkin's lymphoma    No Known Problems Paternal Grandfather     No Known Problems Maternal Aunt     No Known Problems Maternal Aunt     No Known Problems Paternal Aunt     Pancreatic cancer Maternal Uncle     BRCA2 Positive Neg Hx     BRCA2 Negative Neg Hx     BRCA1 Positive Neg Hx     BRCA1 Negative Neg Hx     BRCA 1/2 Neg Hx     Ovarian cancer Neg Hx     Endometrial cancer Neg Hx     Colon cancer Neg Hx     Breast cancer additional onset Neg Hx     Breast cancer Neg Hx      Medications have been verified  Objective   BP (!) 178/74   Pulse 93   Temp 98 4 °F (36 9 °C)   Resp 18   Ht 5' 4\" (1 626 m)   Wt 79 4 kg (175 lb)   LMP 04/06/2023   SpO2 96%   BMI 30 04 kg/m²   Patient's last menstrual period was 04/06/2023  Physical Exam     Physical Exam  Constitutional:       Appearance: Normal appearance  Cardiovascular:      Rate and Rhythm: Normal rate and regular rhythm        " Heart sounds: Normal heart sounds  No murmur heard  No friction rub  No gallop  Pulmonary:      Effort: Pulmonary effort is normal  No respiratory distress  Breath sounds: Normal breath sounds  No stridor  No wheezing, rhonchi or rales  Musculoskeletal:      Comments: Erythema and edema to right index finger paronychium    Neurological:      Mental Status: She is alert

## 2023-05-09 ENCOUNTER — HOSPITAL ENCOUNTER (EMERGENCY)
Facility: HOSPITAL | Age: 28
Discharge: HOME | End: 2023-05-09
Attending: EMERGENCY MEDICINE | Admitting: EMERGENCY MEDICINE

## 2023-05-09 VITALS
TEMPERATURE: 97.4 F | OXYGEN SATURATION: 99 % | HEART RATE: 85 BPM | SYSTOLIC BLOOD PRESSURE: 150 MMHG | RESPIRATION RATE: 16 BRPM | DIASTOLIC BLOOD PRESSURE: 81 MMHG

## 2023-05-09 DIAGNOSIS — L03.011 PARONYCHIA OF FINGER OF RIGHT HAND: Primary | ICD-10-CM

## 2023-05-09 NOTE — ED PROVIDER NOTES
History  Chief Complaint   Patient presents with   • Finger Swelling     Swelling of the right index finger, seen at Urgent Care on Saturday and started on PO antibiotics, referred to ED by PCP due to redness/swelling not improving     51-year-old female presented to the emergency department for evaluation of right index finger swelling  Patient was seen at urgent care on Saturday, diagnosed with paronychia and started on Augmentin  Has been taking this without improvement of symptoms  Area has not been draining  Called her PCP today and reported symptoms are not improving was sent to the emergency department but not seen  No fevers or chills  Patient denies trauma or injury          Prior to Admission Medications   Prescriptions Last Dose Informant Patient Reported? Taking?    Empagliflozin (Jardiance) 10 MG TABS   Yes No   Sig: Take 10 mg by mouth every morning   amoxicillin-clavulanate (AUGMENTIN) 875-125 mg per tablet   No No   Sig: Take 1 tablet by mouth every 12 (twelve) hours for 10 days   digoxin (LANOXIN) 0 25 mg tablet   Yes No   Sig: Take 250 mcg by mouth every morning    digoxin (LANOXIN) 0 25 mg tablet   Yes No   Sig: Take 125 mcg by mouth every evening    hydrochlorothiazide (HYDRODIURIL) 12 5 mg tablet   Yes No   Sig: Take 12 5 mg by mouth daily    Patient not taking: Reported on 5/6/2023   potassium chloride (MICRO-K) 10 MEQ CR capsule   No No   Sig: Take 2 capsules (20 mEq total) by mouth daily   sertraline (ZOLOFT) 25 mg tablet   Yes No   Sig: Take 25 mg by mouth daily as needed    spironolactone (ALDACTONE) 25 mg tablet   Yes No   Sig: Take 25 mg by mouth daily      Facility-Administered Medications: None       Past Medical History:   Diagnosis Date   • ASD (atrial septal defect)    • Cirrhosis of liver ([facility])    • Diabetes mellitus ([facility] [address] )    • IBS (irritable bowel syndrome)    • Pulmonary atresia    • [facility]        Past Surgical History:   Procedure Laterality Date   • CARDIAC SURGERY         Family History   Problem Relation Age of Onset   • Diabetes Mother    • No Known Problems Father    • No Known Problems Maternal Grandmother    • No Known Problems Paternal Grandmother    • Lymphoma Maternal Grandfather         non-Hodgkin's lymphoma   • No Known Problems Paternal Grandfather    • No Known Problems Maternal Aunt    • No Known Problems Maternal Aunt    • No Known Problems Paternal Aunt    • Pancreatic cancer Maternal Uncle    • BRCA2 Positive Neg Hx    • BRCA2 Negative Neg Hx    • BRCA1 Positive Neg Hx    • BRCA1 Negative Neg Hx    • BRCA 1/2 Neg Hx    • Ovarian cancer Neg Hx    • Endometrial cancer Neg Hx    • Colon cancer Neg Hx    • Breast cancer additional onset Neg Hx    • Breast cancer Neg Hx      I have reviewed and agree with the history as documented  E-Cigarette/Vaping   • E-Cigarette Use Never User      E-Cigarette/Vaping Substances     Social History     Tobacco Use   • Smoking status: Never   • Smokeless tobacco: Never   Vaping Use   • Vaping Use: Never used   Substance Use Topics   • Alcohol use: Not Currently   • Drug use: Never       Review of Systems   Skin: Positive for color change  All other systems reviewed and are negative  Physical Exam  Physical Exam  Vitals and nursing note reviewed  Constitutional:       General: She is not in acute distress  Appearance: Normal appearance  She is not ill-appearing, toxic-appearing or diaphoretic  HENT:      Head: Normocephalic  Eyes:      Conjunctiva/sclera: Conjunctivae normal    Pulmonary:      Effort: Pulmonary effort is normal    Skin:     General: Skin is warm and dry  Capillary Refill: Capillary refill takes less than 2 seconds  Comments: Paronychia right index finger   Neurological:      General: No focal deficit present  Mental Status: She is alert           Vital Signs  ED Triage Vitals [05/09/23 1714]   Temperature Pulse Respirations Blood Pressure SpO2   (!) 97 4 "°F (36 3 °C) 85 16 150/81 99 %      Temp Source Heart Rate Source Patient Position - Orthostatic VS BP Location FiO2 (%)   Temporal Monitor Sitting Left arm --      Pain Score       No Pain           Vitals:    05/09/23 1714   BP: 150/81   Pulse: 85   Patient Position - Orthostatic VS: Sitting         Visual Acuity      ED Medications  Medications - No data to display    Diagnostic Studies  Results Reviewed     None                 No orders to display              Procedures  Incision and drain    Date/Time: 5/9/2023 6:40 PM  Performed by: Summer Casiano PA-C  Authorized by: Summer Casiano PA-C   Universal Protocol:  Consent: Verbal consent obtained  Consent given by: patient  Time out: Immediately prior to procedure a \"time out\" was called to verify the correct patient, procedure, equipment, support staff and site/side marked as required  Timeout called at: 5/9/2023 6:40 PM   Patient understanding: patient states understanding of the procedure being performed  Patient consent: the patient's understanding of the procedure matches consent given  Patient identity confirmed: verbally with patient and arm band      Patient location:  ED  Location:     Indications for incision and drainage: Paronychia  Size:  1 cm  Anesthesia (see MAR for exact dosages): Anesthesia method:  None  Procedure details:     Complexity:  Simple    Needle aspiration: yes      Needle size:  20 G    Drainage:  Purulent    Wound treatment:  Wound left open  Post-procedure details:     Patient tolerance of procedure: Tolerated well, no immediate complications             ED Course  ED Course as of 05/09/23 2102 Tue May 09, 2023   1841 Paronychia was drained  Patient is to stay on antibiotics  We will follow-up with PCP in 2 to 3 days for reevaluation               Medical Decision Making  57-year-old female presented to the emergency department for evaluation of right index finger paronychia  Vitals and medical record reviewed    " On Augmentin without improvement of symptoms since Saturday  Not draining  I&D performed  Purulent drainage  Patient was educated continue with Augmentin  We will do warm compresses  We will follow-up with PCP  Return precautions discussed and she verbalized understanding  She is clinically and hemodynamically stable for discharge    Paronychia of finger of right hand: acute illness or injury      Disposition  Final diagnoses:   Paronychia of finger of right hand     Time reflects when diagnosis was documented in both MDM as applicable and the Disposition within this note     Time User Action Codes Description Comment    5/9/2023  6:42 PM Adriannejaja Matthew Add [Z84 [id number]] Paronychia of finger of right hand       ED Disposition     ED Disposition   Discharge    Condition   Stable    Date/Time   Tue May 9, 2023  6:42 PM    [address] discharge to home/self care                 Follow-up Information     Follow up With Specialties Details Why Contact Gee Durand, DO Family Medicine In 3 days For wound re-check [facility]  [phone]            Discharge Medication List as of 5/9/2023  6:42 PM      CONTINUE these medications which have NOT CHANGED    Details   amoxicillin-clavulanate (AUGMENTIN) 875-125 mg per tablet Take 1 tablet by mouth every 12 (twelve) hours for 10 days, Starting Sat 5/6/2023, Until Tue 5/16/2023, Normal      !! digoxin (LANOXIN) 0 25 mg tablet Take 250 mcg by mouth every morning , Starting Wed 5/13/2020, Historical Med      !! digoxin (LANOXIN) 0 25 mg tablet Take 125 mcg by mouth every evening , Historical Med      Empagliflozin (Jardiance) 10 MG TABS Take 10 mg by mouth every morning, Historical Med      hydrochlorothiazide (HYDRODIURIL) 12 5 mg tablet Take 12 5 mg by mouth daily , Historical Med      potassium chloride (MICRO-K) 10 MEQ CR capsule Take 2 capsules (20 mEq total) by mouth daily, Starting Thu 7/29/2021, Until Sat 8/28/2021, Normal      sertraline (ZOLOFT) 25 mg tablet Take 25 mg by mouth daily as needed , Historical Med      spironolactone (ALDACTONE) 25 mg tablet Take 25 mg by mouth daily, Starting Mon 10/18/2021, Historical Med       !! - Potential duplicate medications found  Please discuss with provider  No discharge procedures on file      PDMP Review     None          ED Provider  Electronically Signed by           Maxim Krishnamurthy PA-C  05/09/23 8251

## 2023-05-09 NOTE — DISCHARGE INSTRUCTIONS
Please use warm compresses as we discussed  Continue on antibiotics  Return with new or worsening symptoms

## 2023-09-08 ENCOUNTER — APPOINTMENT (OUTPATIENT)
Dept: LAB | Facility: HOSPITAL | Age: 28
End: 2023-09-08
Payer: COMMERCIAL

## 2023-09-08 DIAGNOSIS — E03.9 MYXEDEMA HEART DISEASE: ICD-10-CM

## 2023-09-08 DIAGNOSIS — I47.1 PAROXYSMAL SUPRAVENTRICULAR TACHYCARDIA (HCC): ICD-10-CM

## 2023-09-08 DIAGNOSIS — E11.65 TYPE II DIABETES MELLITUS WITH HYPEROSMOLARITY, UNCONTROLLED (HCC): ICD-10-CM

## 2023-09-08 DIAGNOSIS — I51.9 MYXEDEMA HEART DISEASE: ICD-10-CM

## 2023-09-08 DIAGNOSIS — E11.00 TYPE II DIABETES MELLITUS WITH HYPEROSMOLARITY, UNCONTROLLED (HCC): ICD-10-CM

## 2023-09-08 LAB
DIGOXIN SERPL-MCNC: <0.5 NG/ML (ref 0.8–2)
EST. AVERAGE GLUCOSE BLD GHB EST-MCNC: 117 MG/DL
HBA1C MFR BLD: 5.7 %
T4 FREE SERPL-MCNC: 0.85 NG/DL (ref 0.61–1.12)
TSH SERPL DL<=0.05 MIU/L-ACNC: 3.29 UIU/ML (ref 0.45–4.5)

## 2023-09-08 PROCEDURE — 84443 ASSAY THYROID STIM HORMONE: CPT

## 2023-09-08 PROCEDURE — 36415 COLL VENOUS BLD VENIPUNCTURE: CPT

## 2023-09-08 PROCEDURE — 83036 HEMOGLOBIN GLYCOSYLATED A1C: CPT

## 2023-09-08 PROCEDURE — 84439 ASSAY OF FREE THYROXINE: CPT

## 2023-09-08 PROCEDURE — 80162 ASSAY OF DIGOXIN TOTAL: CPT

## 2023-09-21 ENCOUNTER — APPOINTMENT (OUTPATIENT)
Dept: LAB | Facility: HOSPITAL | Age: 28
End: 2023-09-21
Payer: COMMERCIAL

## 2023-09-21 DIAGNOSIS — E11.00 TYPE II DIABETES MELLITUS WITH HYPEROSMOLARITY, UNCONTROLLED (HCC): ICD-10-CM

## 2023-09-21 DIAGNOSIS — E11.65 INADEQUATELY CONTROLLED DIABETES MELLITUS (HCC): ICD-10-CM

## 2023-09-21 DIAGNOSIS — I47.10 PAROXYSMAL SUPRAVENTRICULAR TACHYCARDIA: ICD-10-CM

## 2023-09-21 DIAGNOSIS — E11.65 TYPE II DIABETES MELLITUS WITH HYPEROSMOLARITY, UNCONTROLLED (HCC): ICD-10-CM

## 2023-09-21 DIAGNOSIS — E03.9 MYXEDEMA HEART DISEASE: ICD-10-CM

## 2023-09-21 DIAGNOSIS — I51.9 MYXEDEMA HEART DISEASE: ICD-10-CM

## 2023-09-21 LAB
BASOPHILS # BLD AUTO: 0.02 THOUSANDS/ÂΜL (ref 0–0.1)
BASOPHILS NFR BLD AUTO: 0 % (ref 0–1)
EOSINOPHIL # BLD AUTO: 0.15 THOUSAND/ÂΜL (ref 0–0.61)
EOSINOPHIL NFR BLD AUTO: 2 % (ref 0–6)
ERYTHROCYTE [DISTWIDTH] IN BLOOD BY AUTOMATED COUNT: 11.9 % (ref 11.6–15.1)
HCT VFR BLD AUTO: 42.3 % (ref 34.8–46.1)
HGB BLD-MCNC: 14.6 G/DL (ref 11.5–15.4)
IMM GRANULOCYTES # BLD AUTO: 0.03 THOUSAND/UL (ref 0–0.2)
IMM GRANULOCYTES NFR BLD AUTO: 0 % (ref 0–2)
LYMPHOCYTES # BLD AUTO: 1.8 THOUSANDS/ÂΜL (ref 0.6–4.47)
LYMPHOCYTES NFR BLD AUTO: 25 % (ref 14–44)
MCH RBC QN AUTO: 30.3 PG (ref 26.8–34.3)
MCHC RBC AUTO-ENTMCNC: 34.5 G/DL (ref 31.4–37.4)
MCV RBC AUTO: 88 FL (ref 82–98)
MONOCYTES # BLD AUTO: 0.49 THOUSAND/ÂΜL (ref 0.17–1.22)
MONOCYTES NFR BLD AUTO: 7 % (ref 4–12)
NEUTROPHILS # BLD AUTO: 4.81 THOUSANDS/ÂΜL (ref 1.85–7.62)
NEUTS SEG NFR BLD AUTO: 66 % (ref 43–75)
NRBC BLD AUTO-RTO: 0 /100 WBCS
PLATELET # BLD AUTO: 217 THOUSANDS/UL (ref 149–390)
PMV BLD AUTO: 9 FL (ref 8.9–12.7)
RBC # BLD AUTO: 4.82 MILLION/UL (ref 3.81–5.12)
WBC # BLD AUTO: 7.3 THOUSAND/UL (ref 4.31–10.16)

## 2023-09-21 PROCEDURE — 85025 COMPLETE CBC W/AUTO DIFF WBC: CPT

## 2023-09-21 PROCEDURE — 36415 COLL VENOUS BLD VENIPUNCTURE: CPT

## 2023-12-09 ENCOUNTER — APPOINTMENT (OUTPATIENT)
Dept: LAB | Facility: HOSPITAL | Age: 28
End: 2023-12-09
Payer: COMMERCIAL

## 2023-12-09 DIAGNOSIS — I47.19 ATRIAL TACHYCARDIA: ICD-10-CM

## 2023-12-09 DIAGNOSIS — Q21.3 TOF (TETRALOGY OF FALLOT): ICD-10-CM

## 2023-12-09 DIAGNOSIS — R00.2 PALPITATIONS: ICD-10-CM

## 2023-12-09 DIAGNOSIS — I47.10 SVT (SUPRAVENTRICULAR TACHYCARDIA): ICD-10-CM

## 2023-12-09 DIAGNOSIS — R06.02 SHORTNESS OF BREATH: ICD-10-CM

## 2023-12-09 LAB
ANION GAP SERPL CALCULATED.3IONS-SCNC: 7 MMOL/L
BUN SERPL-MCNC: 14 MG/DL (ref 5–25)
CALCIUM SERPL-MCNC: 9.1 MG/DL (ref 8.4–10.2)
CHLORIDE SERPL-SCNC: 102 MMOL/L (ref 96–108)
CO2 SERPL-SCNC: 28 MMOL/L (ref 21–32)
CREAT SERPL-MCNC: 0.8 MG/DL (ref 0.6–1.3)
GFR SERPL CREATININE-BSD FRML MDRD: 100 ML/MIN/1.73SQ M
GLUCOSE P FAST SERPL-MCNC: 115 MG/DL (ref 65–99)
POTASSIUM SERPL-SCNC: 3.8 MMOL/L (ref 3.5–5.3)
SODIUM SERPL-SCNC: 137 MMOL/L (ref 135–147)

## 2023-12-09 PROCEDURE — 80048 BASIC METABOLIC PNL TOTAL CA: CPT

## 2023-12-09 PROCEDURE — 36415 COLL VENOUS BLD VENIPUNCTURE: CPT

## 2024-03-02 ENCOUNTER — APPOINTMENT (OUTPATIENT)
Dept: LAB | Facility: HOSPITAL | Age: 29
End: 2024-03-02
Payer: COMMERCIAL

## 2024-03-02 DIAGNOSIS — K74.60 UNSPECIFIED CIRRHOSIS OF LIVER (HCC): ICD-10-CM

## 2024-03-02 DIAGNOSIS — K74.60 HEPATIC CIRRHOSIS, UNSPECIFIED HEPATIC CIRRHOSIS TYPE, UNSPECIFIED WHETHER ASCITES PRESENT (HCC): ICD-10-CM

## 2024-03-02 DIAGNOSIS — K76.1 CHRONIC PASSIVE CONGESTION OF LIVER: ICD-10-CM

## 2024-03-02 LAB
AFP-TM SERPL-MCNC: 1.34 NG/ML (ref 0–9)
ALBUMIN SERPL BCP-MCNC: 4.6 G/DL (ref 3.5–5)
ALP SERPL-CCNC: 69 U/L (ref 34–104)
ALT SERPL W P-5'-P-CCNC: 21 U/L (ref 7–52)
ANION GAP SERPL CALCULATED.3IONS-SCNC: 5 MMOL/L
AST SERPL W P-5'-P-CCNC: 22 U/L (ref 13–39)
BILIRUB SERPL-MCNC: 0.95 MG/DL (ref 0.2–1)
BUN SERPL-MCNC: 16 MG/DL (ref 5–25)
CALCIUM SERPL-MCNC: 9.7 MG/DL (ref 8.4–10.2)
CHLORIDE SERPL-SCNC: 103 MMOL/L (ref 96–108)
CO2 SERPL-SCNC: 29 MMOL/L (ref 21–32)
CREAT SERPL-MCNC: 0.89 MG/DL (ref 0.6–1.3)
GFR SERPL CREATININE-BSD FRML MDRD: 88 ML/MIN/1.73SQ M
GLUCOSE P FAST SERPL-MCNC: 126 MG/DL (ref 65–99)
POTASSIUM SERPL-SCNC: 3.7 MMOL/L (ref 3.5–5.3)
PROT SERPL-MCNC: 7.7 G/DL (ref 6.4–8.4)
SODIUM SERPL-SCNC: 137 MMOL/L (ref 135–147)

## 2024-03-02 PROCEDURE — 82105 ALPHA-FETOPROTEIN SERUM: CPT

## 2024-03-02 PROCEDURE — 87521 HEPATITIS C PROBE&RVRS TRNSC: CPT

## 2024-03-02 PROCEDURE — 87522 HEPATITIS C REVRS TRNSCRPJ: CPT

## 2024-03-02 PROCEDURE — 36415 COLL VENOUS BLD VENIPUNCTURE: CPT

## 2024-03-02 PROCEDURE — 80053 COMPREHEN METABOLIC PANEL: CPT

## 2024-03-02 PROCEDURE — 83036 HEMOGLOBIN GLYCOSYLATED A1C: CPT

## 2024-03-02 PROCEDURE — 86706 HEP B SURFACE ANTIBODY: CPT

## 2024-03-02 PROCEDURE — 87340 HEPATITIS B SURFACE AG IA: CPT

## 2024-03-02 PROCEDURE — 86709 HEPATITIS A IGM ANTIBODY: CPT

## 2024-03-03 LAB
EST. AVERAGE GLUCOSE BLD GHB EST-MCNC: 117 MG/DL
HAV IGM SER QL: NORMAL
HBA1C MFR BLD: 5.7 %
HBV SURFACE AB SER-ACNC: <3 MIU/ML
HBV SURFACE AG SER QL: NORMAL

## 2024-03-04 LAB — HCV RNA SERPL NAA+PROBE-ACNC: NOT DETECTED K[IU]/ML

## 2024-03-06 ENCOUNTER — APPOINTMENT (OUTPATIENT)
Dept: LAB | Facility: HOSPITAL | Age: 29
End: 2024-03-06
Payer: COMMERCIAL

## 2024-03-06 ENCOUNTER — HOSPITAL ENCOUNTER (OUTPATIENT)
Dept: RADIOLOGY | Facility: HOSPITAL | Age: 29
Discharge: HOME | End: 2024-03-06
Payer: COMMERCIAL

## 2024-03-06 ENCOUNTER — HOSPITAL ENCOUNTER (OUTPATIENT)
Dept: ULTRASOUND IMAGING | Facility: HOSPITAL | Age: 29
Discharge: HOME | End: 2024-03-06
Payer: COMMERCIAL

## 2024-03-06 DIAGNOSIS — R06.00 DYSPNEA, UNSPECIFIED TYPE: ICD-10-CM

## 2024-03-06 DIAGNOSIS — E11.00 TYPE II DIABETES MELLITUS WITH HYPEROSMOLARITY, UNCONTROLLED (HCC): ICD-10-CM

## 2024-03-06 DIAGNOSIS — E11.65 TYPE II DIABETES MELLITUS WITH HYPEROSMOLARITY, UNCONTROLLED (HCC): ICD-10-CM

## 2024-03-06 DIAGNOSIS — K74.60 UNSPECIFIED CIRRHOSIS OF LIVER (HCC): ICD-10-CM

## 2024-03-06 DIAGNOSIS — E03.9 ACQUIRED HYPOTHYROIDISM: ICD-10-CM

## 2024-03-06 LAB
ALBUMIN SERPL BCP-MCNC: 4.5 G/DL (ref 3.5–5)
ALP SERPL-CCNC: 72 U/L (ref 34–104)
ALT SERPL W P-5'-P-CCNC: 20 U/L (ref 7–52)
ANION GAP SERPL CALCULATED.3IONS-SCNC: 2 MMOL/L
AST SERPL W P-5'-P-CCNC: 23 U/L (ref 13–39)
BASOPHILS # BLD AUTO: 0.03 THOUSANDS/ÂΜL (ref 0–0.1)
BASOPHILS NFR BLD AUTO: 0 % (ref 0–1)
BILIRUB SERPL-MCNC: 1.39 MG/DL (ref 0.2–1)
BUN SERPL-MCNC: 16 MG/DL (ref 5–25)
CALCIUM SERPL-MCNC: 9.6 MG/DL (ref 8.4–10.2)
CHLORIDE SERPL-SCNC: 103 MMOL/L (ref 96–108)
CO2 SERPL-SCNC: 30 MMOL/L (ref 21–32)
CREAT SERPL-MCNC: 0.96 MG/DL (ref 0.6–1.3)
CREAT UR-MCNC: 268.9 MG/DL
EOSINOPHIL # BLD AUTO: 0.12 THOUSAND/ÂΜL (ref 0–0.61)
EOSINOPHIL NFR BLD AUTO: 2 % (ref 0–6)
ERYTHROCYTE [DISTWIDTH] IN BLOOD BY AUTOMATED COUNT: 11.9 % (ref 11.6–15.1)
EST. AVERAGE GLUCOSE BLD GHB EST-MCNC: 123 MG/DL
GFR SERPL CREATININE-BSD FRML MDRD: 80 ML/MIN/1.73SQ M
GLUCOSE P FAST SERPL-MCNC: 110 MG/DL (ref 65–99)
HBA1C MFR BLD: 5.9 %
HCT VFR BLD AUTO: 46.4 % (ref 34.8–46.1)
HGB BLD-MCNC: 16 G/DL (ref 11.5–15.4)
IMM GRANULOCYTES # BLD AUTO: 0.01 THOUSAND/UL (ref 0–0.2)
IMM GRANULOCYTES NFR BLD AUTO: 0 % (ref 0–2)
LYMPHOCYTES # BLD AUTO: 1.61 THOUSANDS/ÂΜL (ref 0.6–4.47)
LYMPHOCYTES NFR BLD AUTO: 23 % (ref 14–44)
MCH RBC QN AUTO: 28.9 PG (ref 26.8–34.3)
MCHC RBC AUTO-ENTMCNC: 34.5 G/DL (ref 31.4–37.4)
MCV RBC AUTO: 84 FL (ref 82–98)
MICROALBUMIN UR-MCNC: 28.2 MG/L
MICROALBUMIN/CREAT 24H UR: 10 MG/G CREATININE (ref 0–30)
MONOCYTES # BLD AUTO: 0.38 THOUSAND/ÂΜL (ref 0.17–1.22)
MONOCYTES NFR BLD AUTO: 6 % (ref 4–12)
NEUTROPHILS # BLD AUTO: 4.75 THOUSANDS/ÂΜL (ref 1.85–7.62)
NEUTS SEG NFR BLD AUTO: 69 % (ref 43–75)
NRBC BLD AUTO-RTO: 0 /100 WBCS
PLATELET # BLD AUTO: 229 THOUSANDS/UL (ref 149–390)
PMV BLD AUTO: 9.2 FL (ref 8.9–12.7)
POTASSIUM SERPL-SCNC: 3.9 MMOL/L (ref 3.5–5.3)
PROT SERPL-MCNC: 7.5 G/DL (ref 6.4–8.4)
RBC # BLD AUTO: 5.53 MILLION/UL (ref 3.81–5.12)
SODIUM SERPL-SCNC: 135 MMOL/L (ref 135–147)
T4 FREE SERPL-MCNC: 1.05 NG/DL (ref 0.61–1.12)
TSH SERPL DL<=0.05 MIU/L-ACNC: 2.7 UIU/ML (ref 0.45–4.5)
WBC # BLD AUTO: 6.9 THOUSAND/UL (ref 4.31–10.16)

## 2024-03-06 PROCEDURE — 80053 COMPREHEN METABOLIC PANEL: CPT

## 2024-03-06 PROCEDURE — 82570 ASSAY OF URINE CREATININE: CPT

## 2024-03-06 PROCEDURE — 82043 UR ALBUMIN QUANTITATIVE: CPT

## 2024-03-06 PROCEDURE — 85025 COMPLETE CBC W/AUTO DIFF WBC: CPT

## 2024-03-06 PROCEDURE — 71046 X-RAY EXAM CHEST 2 VIEWS: CPT

## 2024-03-06 PROCEDURE — 36415 COLL VENOUS BLD VENIPUNCTURE: CPT

## 2024-03-06 PROCEDURE — 76705 ECHO EXAM OF ABDOMEN: CPT

## 2024-03-06 PROCEDURE — 83036 HEMOGLOBIN GLYCOSYLATED A1C: CPT

## 2024-03-06 PROCEDURE — 84443 ASSAY THYROID STIM HORMONE: CPT

## 2024-03-06 PROCEDURE — 84439 ASSAY OF FREE THYROXINE: CPT

## 2024-03-15 RX ORDER — BUPROPION HYDROCHLORIDE 300 MG/1
TABLET ORAL
COMMUNITY
Start: 2024-02-14

## 2024-03-15 RX ORDER — METOPROLOL SUCCINATE 25 MG/1
TABLET, EXTENDED RELEASE ORAL
COMMUNITY
Start: 2024-02-22

## 2024-03-15 RX ORDER — LEVOTHYROXINE SODIUM 0.03 MG/1
TABLET ORAL
COMMUNITY
Start: 2024-02-14

## 2024-03-15 RX ORDER — BUPROPION HYDROCHLORIDE 150 MG/1
TABLET ORAL
COMMUNITY
Start: 2024-02-14

## 2024-03-15 RX ORDER — ONDANSETRON 4 MG/1
TABLET, FILM COATED ORAL
COMMUNITY
Start: 2024-01-09

## 2024-03-27 ENCOUNTER — OFFICE VISIT (OUTPATIENT)
Dept: PULMONOLOGY | Facility: CLINIC | Age: 29
End: 2024-03-27
Payer: COMMERCIAL

## 2024-03-27 VITALS
BODY MASS INDEX: 33.12 KG/M2 | SYSTOLIC BLOOD PRESSURE: 124 MMHG | WEIGHT: 194 LBS | DIASTOLIC BLOOD PRESSURE: 72 MMHG | HEIGHT: 64 IN | OXYGEN SATURATION: 96 % | HEART RATE: 94 BPM | TEMPERATURE: 97.2 F

## 2024-03-27 DIAGNOSIS — R06.2 WHEEZING: Primary | ICD-10-CM

## 2024-03-27 DIAGNOSIS — R06.00 DYSPNEA DUE TO COVID-19: ICD-10-CM

## 2024-03-27 DIAGNOSIS — U07.1 DYSPNEA DUE TO COVID-19: ICD-10-CM

## 2024-03-27 PROCEDURE — 94618 PULMONARY STRESS TESTING: CPT | Performed by: INTERNAL MEDICINE

## 2024-03-27 PROCEDURE — 99244 OFF/OP CNSLTJ NEW/EST MOD 40: CPT | Performed by: INTERNAL MEDICINE

## 2024-03-27 RX ORDER — FLUTICASONE PROPIONATE 110 UG/1
2 AEROSOL, METERED RESPIRATORY (INHALATION) 2 TIMES DAILY
Qty: 36 G | Refills: 5 | Status: SHIPPED | OUTPATIENT
Start: 2024-03-27

## 2024-03-27 NOTE — PROGRESS NOTES
Pulmonary Consultation   Kenneth Abreu 28 y.o. female MRN: [id number]  3/27/2024      Assessment:  Wheezing/MOE  Ddx underlying reversible airway disease/asthma, postviral bronchiolitis/bronchitis, or less likely acid reflux, VCD, pulmonary congestion  + History of allergic rhinitis/seasonal during the fall, asthma as a child  Chest x-ray with clear lung fields  Overall improving since COVID-19 infection in 11/2023 but not completely resolved    Plan:  A trial of ICS, Flovent 110 2 puffs q12 hrs  Educated about the proper inhaler use technique, rinse mouth following each use  Will avoid beta agonists given the history of SVT  Check complete pulmonary function test/BD response  Check [place] allergy panel/[place] panel [place] a lot [place] different [place]  6-minute walk test today showed no exertional hypoxemia      Return in about 3 months (around 6/27/2024).        History of Present Illness     New Consult for: Wheezing      Background  28 y.o. female with a h/o tetralogy of Fallot, pulmonary atresia, s/p catheter directed TPVR, ASD, liver cirrhosis, IBS, DM2, class I obesity, CHERRI, hypothyroidism, Nissen fundoplication    Hospitalized at birth to [facility] for tetralogy of Fallot, had open heart surgery/surgical repair and placed on mechanical ventilation for prolonged time.  Reports developing restrictive lung disease after that.    Most recent procedure 5/2022 underwent successful catheter directed placement of pulmonary valve.  Follows with [facility] advanced/congenital heart disease center.  Also on surveillance by hepatology therefore liver cirrhosis/fibrosis thought to be from hepatic congestion.    Referred to pulmonary for evaluation of occasional dyspnea and wheezing with exertion noted since 11/2023.    Symptoms started with feeling tired, fatigue and cough tested positive for COVID-19.  Then started to develop wheezing associated with MOE, slightly  improved since then but not resolved.  Sometimes at rest.  No associated chest congestion, cough.  No orthopnea, or PND.  Known to have intermittent lower extremity edema but nothing out of baseline.  Also reported SpO2 dropped to 88% few times measured by her pulse oximetry.    Denies any change in exercise capacity, still active/independent doing work at home intermittently without limitations.  No fever, chills, night sweats.  No voice changes, dysphagia or aspiration.  No allergic rhinitis symptoms currently.    Reports remote history of mild asthma as a child, told to be exercise-induced, was on inhalers/nebulizer until age of 10 and then discontinued.  Also reports seasonal allergy/allergic rhinitis around the time of the fall including nasal congestion, cough and sinusitis like symptoms however none of the symptoms developed recently.    Seen by sleep medicine at [facility] in 2022, diagnosed with CHERRI.  At that time intentionally lost about 50 pounds.  Reported improvement of the snoring then discontinued the CPAP therapy.    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        Studies:  Imaging and other studies: I have personally reviewed pertinent films in PACS      Pulmonary function testin-minute walk test 3/27/2024-baseline SpO2 97% on room air, heart rate 103, able to walk 240 m in 6 minutes, lowest SpO2 at 94%, maximal heart rate 123, no significant oxygen desaturation    EKG, Pathology, and Other Studies: I have personally reviewed pertinent reports.          Historical  "Information   Past Medical History:   Diagnosis Date    ASD (atrial septal defect)     Cirrhosis of liver (HCC)     Diabetes mellitus (HCC)     IBS (irritable bowel syndrome)     Pulmonary atresia     SVT (supraventricular tachycardia)      Past Surgical History:   Procedure Laterality Date    CARDIAC SURGERY       Family History   Problem Relation Age of Onset    Diabetes Mother     No Known Problems Father     No Known Problems Maternal Grandmother     No Known Problems Paternal Grandmother     Lymphoma Maternal Grandfather         non-Hodgkin's lymphoma    No Known Problems Paternal Grandfather     No Known Problems Maternal Aunt     No Known Problems Maternal Aunt     No Known Problems Paternal Aunt     Pancreatic cancer Maternal Uncle     BRCA2 Positive Neg Hx     BRCA2 Negative Neg Hx     BRCA1 Positive Neg Hx     BRCA1 Negative Neg Hx     BRCA 1/2 Neg Hx     Ovarian cancer Neg Hx     Endometrial cancer Neg Hx     Colon cancer Neg Hx     Breast cancer additional onset Neg Hx     Breast cancer Neg Hx          Medications/Allergies: Reviewed    Vitals: Blood pressure 124/72, pulse 94, temperature (!) 97.2 °F (36.2 °C), height 5' 4\" (1.626 m), weight 88 kg (194 lb), SpO2 96%, not currently breastfeeding. Body mass index is 33.3 kg/m². Oxygen Therapy  SpO2: 96 %      Physical Exam  Body mass index is 33.3 kg/m².   Gen: not in acute distress,   Neck/Eyes: supple, no adenopathy, PERRL  Ear: normal appearance, no significant hearing impairment  Nose:  normal nasal mucosa, no drainage  Mouth:  unremarkable/normal appearance of lips, teeth and gums  Oropharynx: mucosa is moist, no focal lesions or erythema  Salivary glands: soft nontender  Chest: normal respiratory efforts, clear breath sounds bilaterally  CV: RRR, systolic murmurs appreciated, fixed S2 split at pulmonary area, mild +1 edema  Abdomen: soft, non tender  Extremities:  No observed deformity   Skin: unremarkable  Neuro: AAO X3, no focal motor deficit " "        Labs:  Lab Results   Component Value Date    WBC 6.90 03/06/2024    HGB 16.0 (H) 03/06/2024    HCT 46.4 (H) 03/06/2024    MCV 84 03/06/2024     03/06/2024     Lab Results   Component Value Date    GLUCOSE 79 03/14/2015    CALCIUM 9.6 03/06/2024     03/14/2015    K 3.9 03/06/2024    CO2 30 03/06/2024     03/06/2024    BUN 16 03/06/2024    CREATININE 0.96 03/06/2024     No results found for: \"IGE\"  Lab Results   Component Value Date    ALT 20 03/06/2024    AST 23 03/06/2024    ALKPHOS 72 03/06/2024    BILITOT 1.2 (H) 03/14/2015         I have spent a total time of 62 minutes on 03/27/24 in caring for this patient including Diagnostic results, Risks and benefits of tx options, Instructions for management, Patient and family education, Risk factor reductions, Impressions, Documenting in the medical record, Reviewing / ordering tests, medicine, procedures  , and Obtaining or reviewing history  .      Portions of the record may have been created with voice recognition software.  Occasional wrong word or \"sound a like\" substitutions may have occurred due to the inherent limitations of voice recognition software.  Read the chart carefully and recognize, using context, where substitutions have occurred    Dany Musa M.D.  [facility]  Answers submitted by the patient for this visit:  Pulmonology Questionnaire (Submitted on 3/20/2024)  Chief Complaint: Primary symptoms  Chronicity: recurrent  When did you first notice your symptoms?: more than 1 month ago  How often do your symptoms occur?: daily  Since you first noticed this problem, how has it changed?: unchanged  Do you have shortness of breath that occurs with effort or exertion?: Yes  Do you have ear congestion?: No  Do you have heartburn?: Yes  Do you have fatigue?: Yes  Do you have nasal congestion?: No  Do you have shortness of breath when lying flat?: Yes  Do you have shortness of breath when you " wake up?: Yes  Do you have sweats?: No  Have you experienced weight loss?: No  Which of the following makes your symptoms worse?: any activity, lying down, minimal activity  Which of the following makes your symptoms better?: nothing

## 2024-04-03 ENCOUNTER — HOSPITAL ENCOUNTER (OUTPATIENT)
Dept: ULTRASOUND IMAGING | Facility: HOSPITAL | Age: 29
Discharge: HOME | End: 2024-04-03
Payer: COMMERCIAL

## 2024-04-03 ENCOUNTER — APPOINTMENT (OUTPATIENT)
Dept: LAB | Facility: HOSPITAL | Age: 29
End: 2024-04-03
Payer: COMMERCIAL

## 2024-04-03 DIAGNOSIS — R06.2 WHEEZING: ICD-10-CM

## 2024-04-03 DIAGNOSIS — N28.1 CYST OF KIDNEY, ACQUIRED: ICD-10-CM

## 2024-04-03 PROCEDURE — 86331 IMMUNODIFFUSION OUCHTERLONY: CPT

## 2024-04-03 PROCEDURE — 82785 ASSAY OF IGE: CPT

## 2024-04-03 PROCEDURE — 86003 ALLG SPEC IGE CRUDE XTRC EA: CPT

## 2024-04-03 PROCEDURE — 86671 FUNGUS NES ANTIBODY: CPT

## 2024-04-03 PROCEDURE — 86606 ASPERGILLUS ANTIBODY: CPT

## 2024-04-03 PROCEDURE — 36415 COLL VENOUS BLD VENIPUNCTURE: CPT

## 2024-04-03 PROCEDURE — 76705 ECHO EXAM OF ABDOMEN: CPT

## 2024-04-03 PROCEDURE — 86602 ANTINOMYCES ANTIBODY: CPT

## 2024-04-05 LAB

## 2024-04-08 LAB
ASPERGILLUS FUMAGATUS IGG: NEGATIVE
AUREOBASIDIUM PULLULANS IGG: NEGATIVE
LACEYELLA SACCHARI AB SER QL: NEGATIVE
PIGEON SERUM IGG: NEGATIVE
S RECTIVIRGULA AB SER QL ID: NEGATIVE
T VULGARIS AB SER QL ID: NEGATIVE

## 2024-04-12 ENCOUNTER — HOSPITAL ENCOUNTER (OUTPATIENT)
Dept: PULMONOLOGY | Facility: HOSPITAL | Age: 29
End: 2024-04-12
Attending: INTERNAL MEDICINE
Payer: COMMERCIAL

## 2024-04-12 DIAGNOSIS — R06.2 WHEEZING: ICD-10-CM

## 2024-04-12 DIAGNOSIS — U07.1 DYSPNEA DUE TO COVID-19: ICD-10-CM

## 2024-04-12 DIAGNOSIS — R06.00 DYSPNEA DUE TO COVID-19: ICD-10-CM

## 2024-04-12 PROCEDURE — 94726 PLETHYSMOGRAPHY LUNG VOLUMES: CPT

## 2024-04-12 PROCEDURE — 94760 N-INVAS EAR/PLS OXIMETRY 1: CPT

## 2024-04-12 PROCEDURE — 94729 DIFFUSING CAPACITY: CPT | Performed by: INTERNAL MEDICINE

## 2024-04-12 PROCEDURE — 94726 PLETHYSMOGRAPHY LUNG VOLUMES: CPT | Performed by: INTERNAL MEDICINE

## 2024-04-12 PROCEDURE — 94060 EVALUATION OF WHEEZING: CPT

## 2024-04-12 PROCEDURE — 94060 EVALUATION OF WHEEZING: CPT | Performed by: INTERNAL MEDICINE

## 2024-04-12 PROCEDURE — 94729 DIFFUSING CAPACITY: CPT

## 2024-04-12 RX ORDER — ALBUTEROL SULFATE 2.5 MG/3ML
2.5 SOLUTION RESPIRATORY (INHALATION) ONCE AS NEEDED
Status: COMPLETED | OUTPATIENT
Start: 2024-04-12 | End: 2024-04-12

## 2024-04-12 RX ADMIN — ALBUTEROL SULFATE 2.5 MG: 2.5 SOLUTION RESPIRATORY (INHALATION) at 15:11

## 2024-04-22 ENCOUNTER — APPOINTMENT (OUTPATIENT)
Dept: LAB | Facility: HOSPITAL | Age: 29
End: 2024-04-22
Payer: COMMERCIAL

## 2024-04-22 DIAGNOSIS — Q21.3 TOF (TETRALOGY OF FALLOT): ICD-10-CM

## 2024-04-22 LAB
ALBUMIN SERPL BCP-MCNC: 4.3 G/DL (ref 3.5–5)
ALP SERPL-CCNC: 65 U/L (ref 34–104)
ALT SERPL W P-5'-P-CCNC: 18 U/L (ref 7–52)
ANION GAP SERPL CALCULATED.3IONS-SCNC: 6 MMOL/L (ref 4–13)
AST SERPL W P-5'-P-CCNC: 18 U/L (ref 13–39)
BILIRUB SERPL-MCNC: 0.87 MG/DL (ref 0.2–1)
BNP SERPL-MCNC: 61 PG/ML (ref 0–100)
BUN SERPL-MCNC: 15 MG/DL (ref 5–25)
CALCIUM SERPL-MCNC: 9.2 MG/DL (ref 8.4–10.2)
CHLORIDE SERPL-SCNC: 104 MMOL/L (ref 96–108)
CO2 SERPL-SCNC: 28 MMOL/L (ref 21–32)
CREAT SERPL-MCNC: 0.68 MG/DL (ref 0.6–1.3)
GFR SERPL CREATININE-BSD FRML MDRD: 119 ML/MIN/1.73SQ M
GLUCOSE P FAST SERPL-MCNC: 119 MG/DL (ref 65–99)
POTASSIUM SERPL-SCNC: 4.3 MMOL/L (ref 3.5–5.3)
PROT SERPL-MCNC: 7.1 G/DL (ref 6.4–8.4)
SODIUM SERPL-SCNC: 138 MMOL/L (ref 135–147)

## 2024-04-22 PROCEDURE — 80053 COMPREHEN METABOLIC PANEL: CPT

## 2024-04-22 PROCEDURE — 36415 COLL VENOUS BLD VENIPUNCTURE: CPT

## 2024-04-22 PROCEDURE — 83880 ASSAY OF NATRIURETIC PEPTIDE: CPT

## 2024-05-07 NOTE — PRE-PROCEDURE INSTRUCTIONS
Pre-Surgery Instructions:   Medication Instructions    buPROPion (WELLBUTRIN XL) 150 mg 24 hr tablet Take day of surgery.    buPROPion (WELLBUTRIN XL) 300 mg 24 hr tablet Take day of surgery.    fluticasone (Flovent HFA) 110 MCG/ACT inhaler Uses PRN- OK to take day of surgery    hydrochlorothiazide (HYDRODIURIL) 12.5 mg tablet Hold day of surgery.    levothyroxine 25 mcg tablet Take day of surgery.    metoprolol succinate (TOPROL-XL) 25 mg 24 hr tablet Take day of surgery.    metoprolol tartrate (LOPRESSOR) 25 mg tablet Uses PRN- OK to take day of surgery    ondansetron (ZOFRAN) 4 mg tablet Uses PRN- OK to take day of surgery    potassium chloride (MICRO-K) 10 MEQ CR capsule Hold day of surgery.    spironolactone (ALDACTONE) 25 mg tablet Hold day of surgery.   Medication instructions for day surgery reviewed. Please use only a sip of water to take your instructed medications. Avoid all over the counter vitamins, supplements and NSAIDS for one week prior to surgery per anesthesia guidelines. Tylenol is ok to take as needed.     You will receive a call one business day prior to surgery with an arrival time and hospital directions. If your surgery is scheduled on a Monday, the hospital will be calling you on the Friday prior to your surgery. If you have not heard from anyone by 8pm, please call the hospital supervisor through the hospital  at [phone]. ([place] [phone] or [place] [phone]).    Do not eat or drink anything after midnight the night before your surgery, including candy, mints, lifesavers, or chewing gum. Do not drink alcohol 24hrs before your surgery. Try not to smoke at least 24hrs before your surgery.       Follow the pre surgery showering instructions as listed in the “My Surgical Experience Booklet” or otherwise provided by your surgeon's office. Do not use a blade to shave the surgical area 1 week before surgery. It is okay to use a clean electric clippers up to 24 hours before  surgery. Do not apply any lotions, creams, including makeup, cologne, deodorant, or perfumes after showering on the day of your surgery. Do not use dry shampoo, hair spray, hair gel, or any type of hair products.     No contact lenses, eye make-up, or artificial eyelashes. Remove nail polish, including gel polish, and any artificial, gel, or acrylic nails if possible. Remove all jewelry including rings and body piercing jewelry.     Wear causal clothing that is easy to take on and off. Consider your type of surgery.    Keep any valuables, jewelry, piercings at home. Please bring any specially ordered equipment (sling, braces) if indicated.    Arrange for a responsible person to drive you to and from the hospital on the day of your surgery. Please confirm the visitor policy for the day of your procedure when you receive your phone call with an arrival time.     Call the surgeon's office with any new illnesses, exposures, or additional questions prior to surgery.    Please reference your “My Surgical Experience Booklet” for additional information to prepare for your upcoming surgery.

## 2024-05-08 DIAGNOSIS — Z91.89 AT RISK FOR SLEEP APNEA: Primary | ICD-10-CM

## 2024-05-10 ENCOUNTER — HOSPITAL ENCOUNTER (OUTPATIENT)
Facility: HOSPITAL | Age: 29
Setting detail: OUTPATIENT SURGERY
Discharge: HOME | End: 2024-05-10
Attending: STUDENT IN AN ORGANIZED HEALTH CARE EDUCATION/TRAINING PROGRAM | Admitting: STUDENT IN AN ORGANIZED HEALTH CARE EDUCATION/TRAINING PROGRAM
Payer: COMMERCIAL

## 2024-05-10 ENCOUNTER — ANESTHESIA EVENT (OUTPATIENT)
Dept: PERIOP | Facility: HOSPITAL | Age: 29
End: 2024-05-10
Payer: COMMERCIAL

## 2024-05-10 ENCOUNTER — ANESTHESIA (OUTPATIENT)
Dept: PERIOP | Facility: HOSPITAL | Age: 29
End: 2024-05-10
Payer: COMMERCIAL

## 2024-05-10 VITALS
HEIGHT: 64 IN | OXYGEN SATURATION: 98 % | SYSTOLIC BLOOD PRESSURE: 129 MMHG | TEMPERATURE: 97.2 F | BODY MASS INDEX: 38.41 KG/M2 | RESPIRATION RATE: 20 BRPM | HEART RATE: 71 BPM | WEIGHT: 225 LBS | DIASTOLIC BLOOD PRESSURE: 79 MMHG

## 2024-05-10 DIAGNOSIS — D17.1 BENIGN LIPOMATOUS NEOPLASM OF SKIN AND SUBCUTANEOUS TISSUE OF TRUNK: ICD-10-CM

## 2024-05-10 PROBLEM — Q22.0: Status: ACTIVE | Noted: 2017-01-24

## 2024-05-10 PROBLEM — I48.4 ATYPICAL ATRIAL FLUTTER (HCC): Status: ACTIVE | Noted: 2024-05-10

## 2024-05-10 PROBLEM — Q21.10 ATRIAL SEPTAL DEFECT: Status: ACTIVE | Noted: 2024-05-10

## 2024-05-10 LAB
EXT PREGNANCY TEST URINE: NEGATIVE
EXT. CONTROL: NORMAL
GLUCOSE SERPL-MCNC: 138 MG/DL (ref 65–140)
GLUCOSE SERPL-MCNC: 147 MG/DL (ref 65–140)

## 2024-05-10 PROCEDURE — 11404 EXC TR-EXT B9+MARG 3.1-4 CM: CPT

## 2024-05-10 PROCEDURE — 12032 INTMD RPR S/A/T/EXT 2.6-7.5: CPT

## 2024-05-10 PROCEDURE — 82948 REAGENT STRIP/BLOOD GLUCOSE: CPT

## 2024-05-10 PROCEDURE — 81025 URINE PREGNANCY TEST: CPT | Performed by: STUDENT IN AN ORGANIZED HEALTH CARE EDUCATION/TRAINING PROGRAM

## 2024-05-10 PROCEDURE — 88304 TISSUE EXAM BY PATHOLOGIST: CPT | Performed by: PATHOLOGY

## 2024-05-10 RX ORDER — MAGNESIUM HYDROXIDE 1200 MG/15ML
LIQUID ORAL AS NEEDED
Status: DISCONTINUED | OUTPATIENT
Start: 2024-05-10 | End: 2024-05-10 | Stop reason: HOSPADM

## 2024-05-10 RX ORDER — ONDANSETRON 2 MG/ML
INJECTION INTRAMUSCULAR; INTRAVENOUS AS NEEDED
Status: DISCONTINUED | OUTPATIENT
Start: 2024-05-10 | End: 2024-05-10

## 2024-05-10 RX ORDER — FENTANYL CITRATE/PF 50 MCG/ML
25 SYRINGE (ML) INJECTION
Status: DISCONTINUED | OUTPATIENT
Start: 2024-05-10 | End: 2024-05-10 | Stop reason: HOSPADM

## 2024-05-10 RX ORDER — ASPIRIN 325 MG
325 TABLET ORAL DAILY
COMMUNITY

## 2024-05-10 RX ORDER — CEFAZOLIN SODIUM 2 G/50ML
2000 SOLUTION INTRAVENOUS ONCE
Status: COMPLETED | OUTPATIENT
Start: 2024-05-10 | End: 2024-05-10

## 2024-05-10 RX ORDER — FENTANYL CITRATE 50 UG/ML
INJECTION, SOLUTION INTRAMUSCULAR; INTRAVENOUS AS NEEDED
Status: DISCONTINUED | OUTPATIENT
Start: 2024-05-10 | End: 2024-05-10

## 2024-05-10 RX ORDER — BUPIVACAINE HYDROCHLORIDE AND EPINEPHRINE 2.5; 5 MG/ML; UG/ML
INJECTION, SOLUTION INFILTRATION; PERINEURAL AS NEEDED
Status: DISCONTINUED | OUTPATIENT
Start: 2024-05-10 | End: 2024-05-10 | Stop reason: HOSPADM

## 2024-05-10 RX ORDER — SODIUM CHLORIDE, SODIUM LACTATE, POTASSIUM CHLORIDE, CALCIUM CHLORIDE 600; 310; 30; 20 MG/100ML; MG/100ML; MG/100ML; MG/100ML
50 INJECTION, SOLUTION INTRAVENOUS CONTINUOUS
Status: DISCONTINUED | OUTPATIENT
Start: 2024-05-10 | End: 2024-05-10 | Stop reason: HOSPADM

## 2024-05-10 RX ORDER — SODIUM CHLORIDE, SODIUM LACTATE, POTASSIUM CHLORIDE, CALCIUM CHLORIDE 600; 310; 30; 20 MG/100ML; MG/100ML; MG/100ML; MG/100ML
INJECTION, SOLUTION INTRAVENOUS CONTINUOUS PRN
Status: DISCONTINUED | OUTPATIENT
Start: 2024-05-10 | End: 2024-05-10

## 2024-05-10 RX ORDER — LIDOCAINE HYDROCHLORIDE 10 MG/ML
0.5 INJECTION, SOLUTION EPIDURAL; INFILTRATION; INTRACAUDAL; PERINEURAL ONCE AS NEEDED
Status: DISCONTINUED | OUTPATIENT
Start: 2024-05-10 | End: 2024-05-10 | Stop reason: HOSPADM

## 2024-05-10 RX ORDER — MIDAZOLAM HYDROCHLORIDE 2 MG/2ML
INJECTION, SOLUTION INTRAMUSCULAR; INTRAVENOUS AS NEEDED
Status: DISCONTINUED | OUTPATIENT
Start: 2024-05-10 | End: 2024-05-10

## 2024-05-10 RX ORDER — ONDANSETRON 2 MG/ML
4 INJECTION INTRAMUSCULAR; INTRAVENOUS ONCE AS NEEDED
Status: DISCONTINUED | OUTPATIENT
Start: 2024-05-10 | End: 2024-05-10 | Stop reason: HOSPADM

## 2024-05-10 RX ORDER — PROPOFOL 10 MG/ML
INJECTION, EMULSION INTRAVENOUS CONTINUOUS PRN
Status: DISCONTINUED | OUTPATIENT
Start: 2024-05-10 | End: 2024-05-10

## 2024-05-10 RX ORDER — LIDOCAINE HYDROCHLORIDE 10 MG/ML
INJECTION, SOLUTION EPIDURAL; INFILTRATION; INTRACAUDAL; PERINEURAL AS NEEDED
Status: DISCONTINUED | OUTPATIENT
Start: 2024-05-10 | End: 2024-05-10

## 2024-05-10 RX ADMIN — LIDOCAINE HYDROCHLORIDE 50 MG: 10 INJECTION, SOLUTION EPIDURAL; INFILTRATION; INTRACAUDAL; PERINEURAL at 11:48

## 2024-05-10 RX ADMIN — PHENYLEPHRINE HYDROCHLORIDE 150 MCG: 10 INJECTION INTRAVENOUS at 11:56

## 2024-05-10 RX ADMIN — MIDAZOLAM 2 MG: 1 INJECTION INTRAMUSCULAR; INTRAVENOUS at 11:35

## 2024-05-10 RX ADMIN — FENTANYL CITRATE 50 MCG: 50 INJECTION INTRAMUSCULAR; INTRAVENOUS at 11:47

## 2024-05-10 RX ADMIN — SODIUM CHLORIDE, SODIUM LACTATE, POTASSIUM CHLORIDE, AND CALCIUM CHLORIDE: .6; .31; .03; .02 INJECTION, SOLUTION INTRAVENOUS at 11:35

## 2024-05-10 RX ADMIN — PHENYLEPHRINE HYDROCHLORIDE 50 MCG: 10 INJECTION INTRAVENOUS at 12:06

## 2024-05-10 RX ADMIN — PROPOFOL 120 MCG/KG/MIN: 10 INJECTION, EMULSION INTRAVENOUS at 11:47

## 2024-05-10 RX ADMIN — SODIUM CHLORIDE 8 MCG: 9 INJECTION, SOLUTION INTRAVENOUS at 11:48

## 2024-05-10 RX ADMIN — PROPOFOL 30 MG: 10 INJECTION, EMULSION INTRAVENOUS at 11:56

## 2024-05-10 RX ADMIN — CEFAZOLIN SODIUM 2000 MG: 2 SOLUTION INTRAVENOUS at 11:41

## 2024-05-10 RX ADMIN — ONDANSETRON 4 MG: 2 INJECTION INTRAMUSCULAR; INTRAVENOUS at 11:35

## 2024-05-10 NOTE — ANESTHESIA PREPROCEDURE EVALUATION
Procedure:  BACK LIPOMA EXCISION (2 X 1CM) (Right: Back)    Relevant Problems   CARDIO   (+) Atypical atrial flutter (HCC)   (+) Congenital atresia of pulmonary valve   (+) SVT (supraventricular tachycardia)      ENDO   (+) Type 2 diabetes mellitus without complication, without long-term current use of insulin (HCC)      GI/HEPATIC   (+) Other cirrhosis of liver (HCC)      Cardiovascular/Peripheral Vascular   (+) Atrial septal defect     Tetralogy of Fallot status post pulmonic valve replacement    Cormack I with Mac 3 on 5/17/22    Physical Exam    Airway    Mallampati score: III  TM Distance: >3 FB  Neck ROM: full     Dental   No notable dental hx     Cardiovascular      Pulmonary      Other Findings  post-pubertal.      Anesthesia Plan  ASA Score- 3     Anesthesia Type- IV sedation with anesthesia with ASA Monitors.         Additional Monitors:     Airway Plan:     Comment: I discussed the risks and benefits of IV sedation anesthesia including the possibility of the need to convert to general anesthesia and the potential risk of awareness.       Plan Factors-Exercise tolerance (METS): >4 METS.Exercise comment: Able to climb two flights of stairs without cardiopulmonary limitation.    Chart reviewed.   Existing labs reviewed.     Patient is not a current smoker.  Patient did not smoke on day of surgery.            Induction- intravenous.    Postoperative Plan- Plan for postoperative opioid use.     Informed Consent- Anesthetic plan and risks discussed with patient.

## 2024-05-10 NOTE — ANESTHESIA POSTPROCEDURE EVALUATION
Post-Op Assessment Note    CV Status:  Stable    Pain management: adequate       Mental Status:  Sleepy and awake   Hydration Status:  Euvolemic   PONV Controlled:  Controlled   Airway Patency:  Patent     Post Op Vitals Reviewed: Yes    No anethesia notable event occurred.    Staff: Anesthesiologist               /54 (05/10/24 1216)    Temp 97.8 °F (36.6 °C) (05/10/24 1216)    Pulse 82 (05/10/24 1216)   Resp      SpO2 99 % (05/10/24 1216)

## 2024-05-10 NOTE — DISCHARGE INSTR - AVS FIRST PAGE
Lipoma Excision  Call Dr. Mathis's office with any questions or concerns [phone]    AFTER YOU LEAVE:     You may shower but do not tub bathe for 2 weeks.  The incision may get wet and pat dry.  The glue will fall off on its own in 1-2 weeks.    Use Tylenol or ibuprofen as directed on the bottle for pain control    You may apply ice to the incision as needed for comfort    Follow-up as scheduled    Seek care immediately or call 911 if:   You feel a lump in your throat or have trouble swallowing.    You suddenly have trouble breathing.

## 2024-05-10 NOTE — OP NOTE
OPERATIVE REPORT  PATIENT NAME: Kenneth Abreu    :  1995  MRN: [id number]  Pt Location: [facility] OR ROOM 01    SURGERY DATE: 5/10/2024    Surgeons and Role:     * Vida Mathis DO - Primary     * Katrina Elizabeth Billig, PA-C - Assisting    Preop Diagnosis:  Benign lipomatous neoplasm of skin and subcutaneous tissue of trunk [D17.1]    Post-Op Diagnosis Codes:     * Benign lipomatous neoplasm of skin and subcutaneous tissue of trunk [D17.1]    Procedure(s):  Right - BACK LIPOMA EXCISION (2 X 1CM)    Specimen(s):  ID Type Source Tests Collected by Time Destination   1 : right back lipoma Tissue Soft Tissue, Lipoma TISSUE EXAM Vida Mathis DO 5/10/2024 12:02 PM        Estimated Blood Loss:   Minimal    Drains:  * No LDAs found *    Anesthesia Type:   IV Sedation with Anesthesia    Operative Indications:  Benign lipomatous neoplasm of skin and subcutaneous tissue of trunk [D17.1]      Operative Findings:  2 x 1 cm lipoma of the subcutaneous tissue of the right back    Complications:   None    Procedure and Technique:  The patient is brought to the operating.  A timeout was held the patient identified and procedure verified.  Appropriate antibiotic prophylaxis and DVT prophylaxis was used.  The patient was placed in left lateral decubitus position.  All pressure points were padded.  The area of the right back was prepped and draped in usual sterile fashion using chlorhexidine solution.  Local anesthesia using 0.25% Marcaine with epinephrine was infiltrated around the palpable lipoma.  An incision was made using a 15 blade scalpel.  Dissection was carried down to the subcutaneous tissue.  The lipoma was encountered.  Blunt and sharp dissection was used to free the lipoma from surrounding tissue.  Lipoma was removed in its entirety.  The area was irrigated.  Hemostasis was achieved using electrocautery.  Subcutaneous tissue was closed using 3-0 Vicryl.  Skin was closed using 4-0 Vicryl in the  subcuticular layer.  The incision was covered with skin glue.  The patient tolerated procedure well and was transferred to recovery in stable condition.  At the end of the procedure all needle instrument sponge counts were correct x 2.   I was present for the entire procedure. and A physician assistant was required during the procedure for retraction, tissue handling, dissection and suturing.    Patient Disposition:  PACU         SIGNATURE: Vida Mathis DO  DATE: May 10, 2024  TIME: 12:09 PM

## 2024-05-10 NOTE — INTERVAL H&P NOTE
H&P reviewed. After examining the patient I find no changes in the patients condition since the H&P had been written.    Vitals:    05/10/24 1006   BP: 138/63   Pulse: 85   Resp: 18   Temp: 98 °F (36.7 °C)   SpO2: 97%

## 2024-05-15 PROCEDURE — 88304 TISSUE EXAM BY PATHOLOGIST: CPT | Performed by: PATHOLOGY

## 2024-07-03 ENCOUNTER — OFFICE VISIT (OUTPATIENT)
Dept: PULMONOLOGY | Facility: CLINIC | Age: 29
End: 2024-07-03
Payer: COMMERCIAL

## 2024-07-03 VITALS
BODY MASS INDEX: 38.76 KG/M2 | DIASTOLIC BLOOD PRESSURE: 76 MMHG | HEIGHT: 64 IN | SYSTOLIC BLOOD PRESSURE: 132 MMHG | OXYGEN SATURATION: 97 % | WEIGHT: 227 LBS | TEMPERATURE: 98.2 F | HEART RATE: 83 BPM

## 2024-07-03 DIAGNOSIS — J45.40 MODERATE PERSISTENT ASTHMA WITHOUT COMPLICATION: Primary | ICD-10-CM

## 2024-07-03 DIAGNOSIS — G47.33 OSA (OBSTRUCTIVE SLEEP APNEA): ICD-10-CM

## 2024-07-03 PROCEDURE — 99214 OFFICE O/P EST MOD 30 MIN: CPT | Performed by: INTERNAL MEDICINE

## 2024-07-03 RX ORDER — LEVALBUTEROL INHALATION SOLUTION 0.63 MG/3ML
0.63 SOLUTION RESPIRATORY (INHALATION) EVERY 4 HOURS PRN
Qty: 270 ML | Refills: 0 | Status: SHIPPED | OUTPATIENT
Start: 2024-07-03 | End: 2024-08-02

## 2024-07-03 RX ORDER — LEVALBUTEROL TARTRATE 45 UG/1
1-2 AEROSOL, METERED ORAL EVERY 4 HOURS
Qty: 15 G | Refills: 5 | Status: SHIPPED | OUTPATIENT
Start: 2024-07-03

## 2024-07-03 NOTE — PROGRESS NOTES
"Pulmonary Follow Up Note   Kenneth Abreu 28 y.o. female MRN: [id number]  7/3/2024    Assessment:  Moderate persistent asthma  Seems nonallergic type II, possibly from overweight  Negative [facility] allergy panel, no peripheral eosinophilia  + History of allergic rhinitis, seasonal allergies during the fall and asthma as a child  PFT with significant BD response at the level of FEV1, air trapping and normal DLCO  Avoided beta agonists due to hx SVT in the past  On Flovent 115 only      Plan:  Needs to step up treatment  Given the list of the ICS preparation, she will check with her insurance which 1 is fully covered since the Flovent has a high co-pay  Will choose a higher dose ICS  Will add beta agonist inhaler with caution due to history of SVT  Ordered Xopenex HFA to be used 2 puffs every 6  Nebulizer/supplies with PRN Xopenex via nebulizer    Hx CHERRI  Last seen by sleep medicine at [facility] in 2/2022  At that time was using the CPAP however stopped using it after lost approximately 50 pounds  Reported improvement of snoring since lost weight  However, regained a lot of weight back to be class II obesity BMI of 38 currently  Now with daytime fatigue, excessive sleepiness and unrefreshing sleep  Ordered in lab sleep study  Counseled extensively about weight loss/restricting calories intake    Return in about 3 months (around 10/3/2024).    History of Present Illness     Follow up for: Asthma    Background:  As per initial consult notes    \"28 y.o. female with a h/o tetralogy of Fallot, pulmonary atresia, s/p catheter directed TPVR, ASD, liver cirrhosis, IBS, DM2, class I obesity, CHERRI, hypothyroidism, Nissen fundoplication     Hospitalized at birth to [facility] for tetralogy of Fallot, had open heart surgery/surgical repair and placed on mechanical ventilation for prolonged time.  Reports developing restrictive lung disease after that.     Most recent procedure 5/2022 underwent successful catheter " "directed placement of pulmonary valve.  Follows with [facility] for advanced/congenital heart disease center.  Also on surveillance by hepatology therefore liver cirrhosis/fibrosis thought to be from hepatic congestion.     Referred to pulmonary for evaluation of occasional dyspnea and wheezing with exertion noted since 11/2023.     Symptoms started with feeling tired, fatigue and cough tested positive for COVID-19.  Then started to develop wheezing associated with MOE, slightly improved since then but not resolved.  Sometimes at rest.  No associated chest congestion, cough.  No orthopnea, or PND.  Known to have intermittent lower extremity edema but nothing out of baseline.  Also reported SpO2 dropped to 88% few times measured by her pulse oximetry.     Denies any change in exercise capacity, still active/independent doing work at home intermittently without limitations.  No fever, chills, night sweats.  No voice changes, dysphagia or aspiration.  No allergic rhinitis symptoms currently.     Reports remote history of mild asthma as a child, told to be exercise-induced, was on inhalers/nebulizer until age of 10 and then discontinued.  Also reports seasonal allergy/allergic rhinitis around the time of the fall including nasal congestion, cough and sinusitis like symptoms however none of the symptoms developed recently.     Seen by sleep medicine at [facility] in 2/2022, diagnosed with CHERRI.  At that time intentionally lost about 50 pounds.  Reported improvement of the snoring then discontinued the CPAP therapy.     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  Negative [facility] allergy panel/HP panel  Interval History  Since last seen, no major changes.  Noted partial relief with the Flovent used during episodes of chest tightness, and wheezing and cough.  No palpitation with using albuterol nebs during the PFT.  Otherwise continue to be active and independent at baseline, no specific triggers to the asthma symptoms.     Still was feeling daytime fatigue, excessive sleepiness more than usual.  Actively trying to lose weight by restricting calories intake.  Concerned about recurrence of CHERRI symptoms.    Review of Systems  As per hpi, all other systems reviewed and were negative    Studies:  Imaging and other studies: I have personally reviewed pertinent films in PACS        Pulmonary function testing:   PFT 4/12/2024-ratio 74%, FEV1 1.54 L / 47%, FVC 2.08 L / 54%, TLC 93%, %, DLCO 117%.  10% increase of FEV1 following BD administration    6-minute walk test 3/27/2024-baseline SpO2 97% on room air, heart rate 103, able to walk 240 m in 6 minutes, lowest SpO2 at 94%, maximal heart rate 123, no significant oxygen desaturation     EKG, Pathology, and Other Studies: I have personally reviewed pertinent reports.            Past medical, surgical, social and family histories reviewed.      Medications/Allergies: Reviewed      Vitals: Blood pressure 132/76, pulse 83, temperature 98.2 °F (36.8 °C), height 5' 4\" (1.626 m), weight 103 kg (227 lb), SpO2 97%, not currently breastfeeding. Body mass index is 38.96 kg/m². Oxygen Therapy  SpO2: 97 %      Physical Exam  Body mass index is 38.96 kg/m².   Gen: not in acute distress,   Neck/Eyes: supple, no adenopathy, PERRL  Ear: normal appearance, no significant hearing impairment  Nose:  normal nasal mucosa, no drainage  Mouth:  unremarkable/normal appearance of lips, teeth and gums  Oropharynx: mucosa " "is moist, no focal lesions or erythema  Salivary glands: soft nontender  Chest: normal respiratory efforts, clear breath sounds bilaterally  CV: RRR, systolic murmurs appreciated, audible S2 split, no edema  Abdomen: soft, non tender  Extremities:  No observed deformity   Skin: unremarkable  Neuro: AAO X3, no focal motor deficit        Labs:  Lab Results   Component Value Date    WBC 6.90 03/06/2024    HGB 16.0 (H) 03/06/2024    HCT 46.4 (H) 03/06/2024    MCV 84 03/06/2024     03/06/2024     Lab Results   Component Value Date    GLUCOSE 79 03/14/2015    CALCIUM 9.2 04/22/2024     03/14/2015    K 4.3 04/22/2024    CO2 28 04/22/2024     04/22/2024    BUN 15 04/22/2024    CREATININE 0.68 04/22/2024     Lab Results   Component Value Date    IGE 14.8 04/03/2024     Lab Results   Component Value Date    ALT 18 04/22/2024    AST 18 04/22/2024    ALKPHOS 65 04/22/2024    BILITOT 1.2 (H) 03/14/2015           Portions of the record may have been created with voice recognition software.  Occasional wrong word or \"sound a like\" substitutions may have occurred due to the inherent limitations of voice recognition software.  Read the chart carefully and recognize, using context, where substitutions have occurred    Dany Musa M.D.  [facility]    Answers submitted by the patient for this visit:  Pulmonology Questionnaire (Submitted on 6/26/2024)  Chief Complaint: Primary symptoms  Do you have chest tightness?: Yes  Chronicity: recurrent  When did you first notice your symptoms?: more than 1 month ago  How often do your symptoms occur?: daily  Since you first noticed this problem, how has it changed?: unchanged  Do you have shortness of breath that occurs with effort or exertion?: Yes  Do you have ear congestion?: No  Do you have heartburn?: No  Do you have fatigue?: Yes  Do you have nasal congestion?: No  Do you have shortness of breath when lying flat?: Yes  Do you have " shortness of breath when you wake up?: No  Do you have sweats?: No  Have you experienced weight loss?: No  Which of the following makes your symptoms worse?: change in weather, minimal activity  Which of the following makes your symptoms better?: nothing

## 2024-07-05 ENCOUNTER — TELEPHONE (OUTPATIENT)
Age: 29
End: 2024-07-05

## 2024-07-08 DIAGNOSIS — G47.33 OSA (OBSTRUCTIVE SLEEP APNEA): Primary | ICD-10-CM

## 2024-08-25 ENCOUNTER — OFFICE VISIT (OUTPATIENT)
Dept: URGENT CARE | Facility: CLINIC | Age: 29
End: 2024-08-25
Payer: COMMERCIAL

## 2024-08-25 VITALS
BODY MASS INDEX: 38.76 KG/M2 | RESPIRATION RATE: 20 BRPM | HEIGHT: 64 IN | OXYGEN SATURATION: 96 % | TEMPERATURE: 96.8 F | DIASTOLIC BLOOD PRESSURE: 76 MMHG | SYSTOLIC BLOOD PRESSURE: 176 MMHG | HEART RATE: 109 BPM | WEIGHT: 227 LBS

## 2024-08-25 DIAGNOSIS — J02.9 ACUTE PHARYNGITIS, UNSPECIFIED ETIOLOGY: Primary | ICD-10-CM

## 2024-08-25 LAB — S PYO AG THROAT QL: NEGATIVE

## 2024-08-25 PROCEDURE — 87880 STREP A ASSAY W/OPTIC: CPT

## 2024-08-25 PROCEDURE — S9083 URGENT CARE CENTER GLOBAL: HCPCS

## 2024-08-25 PROCEDURE — G0382 LEV 3 HOSP TYPE B ED VISIT: HCPCS

## 2024-08-25 RX ORDER — PREDNISONE 20 MG/1
40 TABLET ORAL DAILY
Qty: 10 TABLET | Refills: 0 | Status: SHIPPED | OUTPATIENT
Start: 2024-08-25 | End: 2024-08-30

## 2024-08-25 NOTE — PROGRESS NOTES
[facility]        NAME: Kenneth Abreu is a 28 y.o. female  : 1995    MRN: [id number]  DATE: 2024  TIME: 2:50 PM    Assessment and Plan   Acute pharyngitis, unspecified etiology [J02.9]  1. Acute pharyngitis, unspecified etiology  POCT rapid ANTIGEN strepA    predniSONE 20 mg tablet        POCT rapid strep: negative    Patient Instructions     Take prednisone as prescribed - take in morning     Fluids and rest  Salt water gargles and chloraseptic spray  Throat Coat Tea  Wash hands frequently  Don't share drinks  Tylenol/Ibuprofen for pain/fever    Follow up with PCP in 3-5 days.  Proceed to  ER if symptoms worsen.    If tests are performed, our office will contact you with results only if changes need to made to the care plan discussed with you at the visit. [facility]    Chief Complaint     Chief Complaint   Patient presents with    Sore Throat     Sore throat started Tuesday          History of Present Illness       28-year-old female arrives reporting sore throat that has been increasing over the past 5 days.  Patient reports her mom has tested positive for COVID last week, and she has been testing every day and got negative results every day.  Patient does not wish to be retested for COVID at this time.  Patient reports sore throat and pain with swallowing with a slight cough at present.  Patient denies any fevers.  Patient denies any chest pain, shortness of breath or abdominal pain.    Sore Throat   This is a new problem. The current episode started in the past 7 days. The problem has been waxing and waning. Neither side of throat is experiencing more pain than the other. There has been no fever. The pain is at a severity of 5/10. The patient is experiencing no pain. Associated symptoms include coughing and a hoarse voice. Pertinent negatives include no abdominal pain, congestion, diarrhea, drooling, ear discharge, ear pain, headaches,  plugged ear sensation, neck pain, shortness of breath, stridor, swollen glands, trouble swallowing or vomiting.       Review of Systems   Review of Systems   Constitutional:  Negative for chills, diaphoresis, fatigue and fever.   HENT:  Positive for hoarse voice and sore throat. Negative for congestion, drooling, ear discharge, ear pain and trouble swallowing.    Respiratory:  Positive for cough. Negative for shortness of breath and stridor.    Gastrointestinal:  Negative for abdominal pain, diarrhea, nausea and vomiting.   Musculoskeletal:  Negative for neck pain.   Neurological:  Negative for headaches.         Current Medications       Current Outpatient Medications:     aspirin 325 mg tablet, Take 325 mg by mouth daily, Disp: , Rfl:     buPROPion (WELLBUTRIN XL) 150 mg 24 hr tablet, , Disp: , Rfl:     buPROPion (WELLBUTRIN XL) 300 mg 24 hr tablet, , Disp: , Rfl:     fluticasone (Flovent HFA) 110 MCG/ACT inhaler, Inhale 2 puffs 2 (two) times a day Rinse mouth after use., Disp: 36 g, Rfl: 5    hydrochlorothiazide (HYDRODIURIL) 12.5 mg tablet, Take 12.5 mg by mouth daily, Disp: , Rfl:     levalbuterol (Xopenex HFA) 45 mcg/act inhaler, Inhale 1-2 puffs every 4 (four) hours, Disp: 15 g, Rfl: 5    levothyroxine 25 mcg tablet, , Disp: , Rfl:     metoprolol succinate (TOPROL-XL) 25 mg 24 hr tablet, , Disp: , Rfl:     ondansetron (ZOFRAN) 4 mg tablet, TAKE 1 TABLET BY MOUTH EVERY 6 HOURS IF NEEDED FOR NAUSEA AND VOMITING, Disp: , Rfl:     predniSONE 20 mg tablet, Take 2 tablets (40 mg total) by mouth daily for 5 days, Disp: 10 tablet, Rfl: 0    spironolactone (ALDACTONE) 25 mg tablet, Take 25 mg by mouth daily, Disp: , Rfl:     digoxin (LANOXIN) 0.25 mg tablet, Take 250 mcg by mouth every morning  (Patient not taking: Reported on 3/27/2024), Disp: , Rfl:     digoxin (LANOXIN) 0.25 mg tablet, Take 125 mcg by mouth every evening  (Patient not taking: Reported on 3/27/2024), Disp: , Rfl:     metoprolol tartrate (LOPRESSOR)  "25 mg tablet, Take 25 mg by mouth as needed \"Prn for cardiac episodes\" (Patient not taking: Reported on 7/3/2024), Disp: , Rfl:     potassium chloride (MICRO-K) 10 MEQ CR capsule, Take 2 capsules (20 mEq total) by mouth daily, Disp: 60 capsule, Rfl: 0    Current Allergies     Allergies as of 08/25/2024 - Reviewed 08/25/2024   Allergen Reaction Noted    Cholestatin Other (See Comments) and Hives 02/12/2022    Procainamide Other (See Comments) and Rash 10/16/2010            The following portions of the patient's history were reviewed and updated as appropriate: allergies, current medications, past family history, past medical history, past social history, past surgical history and problem list.     Past Medical History:   Diagnosis Date    ASD (atrial septal defect)     Asthma     Cirrhosis of liver (HCC)     COVID-19 11/2023    Diabetes mellitus (HCC)     History of transfusion     IBS (irritable bowel syndrome)     Pulmonary atresia     SVT (supraventricular tachycardia)        Past Surgical History:   Procedure Laterality Date    CARDIAC SURGERY      NISSEN FUNDOPLICATION      as an infant    CA EXCISION TUMOR SOFT TISSUE BACK/FLANK SUBQ <3CM Right 5/10/2024    Procedure: BACK LIPOMA EXCISION (2 X 1CM);  Surgeon: Vida Mathis DO;  Location:  MAIN OR;  Service: General       Family History   Problem Relation Age of Onset    Diabetes Mother     No Known Problems Father     No Known Problems Maternal Grandmother     No Known Problems Paternal Grandmother     Lymphoma Maternal Grandfather         non-Hodgkin's lymphoma    No Known Problems Paternal Grandfather     No Known Problems Maternal Aunt     No Known Problems Maternal Aunt     No Known Problems Paternal Aunt     Pancreatic cancer Maternal Uncle     BRCA2 Positive Neg Hx     BRCA2 Negative Neg Hx     BRCA1 Positive Neg Hx     BRCA1 Negative Neg Hx     BRCA 1/2 Neg Hx     Ovarian cancer Neg Hx     Endometrial cancer Neg Hx     Colon cancer Neg Hx  " "   Breast cancer additional onset Neg Hx     Breast cancer Neg Hx          Medications have been verified.        Objective   BP (!) 176/76   Pulse (!) 109   Temp (!) 96.8 °F (36 °C)   Resp 20   Ht 5' 4\" (1.626 m)   Wt 103 kg (227 lb)   LMP 08/20/2024 (Approximate)   SpO2 96%   BMI 38.96 kg/m²        Physical Exam     Physical Exam  Vitals and nursing note reviewed.   Constitutional:       General: She is not in acute distress.     Appearance: Normal appearance. She is well-developed. She is not ill-appearing.   HENT:      Head: Normocephalic.      Right Ear: Tympanic membrane, ear canal and external ear normal.      Left Ear: Tympanic membrane, ear canal and external ear normal.      Nose: Nose normal.      Mouth/Throat:      Mouth: Mucous membranes are moist.      Pharynx: Posterior oropharyngeal erythema present.   Eyes:      Extraocular Movements: Extraocular movements intact.      Conjunctiva/sclera: Conjunctivae normal.      Pupils: Pupils are equal, round, and reactive to light.   Cardiovascular:      Rate and Rhythm: Normal rate and regular rhythm.      Pulses: Normal pulses.      Heart sounds: Normal heart sounds.   Pulmonary:      Effort: Pulmonary effort is normal. No respiratory distress.      Breath sounds: Normal breath sounds. No stridor. No wheezing, rhonchi or rales.   Chest:      Chest wall: No tenderness.   Musculoskeletal:         General: Normal range of motion.      Cervical back: Normal range of motion and neck supple.   Lymphadenopathy:      Cervical: No cervical adenopathy.   Skin:     General: Skin is warm and dry.      Capillary Refill: Capillary refill takes less than 2 seconds.   Neurological:      General: No focal deficit present.      Mental Status: She is alert and oriented to person, place, and time.   Psychiatric:         Mood and Affect: Mood normal.         Behavior: Behavior normal.                   "

## 2024-08-25 NOTE — PATIENT INSTRUCTIONS
POCT rapid strep: negative    Take prednisone as prescribed - take in morning     Fluids and rest  Salt water gargles and chloraseptic spray  Throat Coat Tea  Wash hands frequently  Don't share drinks  Tylenol/Ibuprofen for pain/fever    Follow up with PCP in 3-5 days.  Proceed to  ER if symptoms worsen.    If tests are performed, our office will contact you with results only if changes need to made to the care plan discussed with you at the visit. You can review your full results on [facility].    Patient Education     Sore throat in adults   The Basics   Written by the doctors and editors at [facility]   What causes sore throat? -- Sore throat is usually caused by an infection. Two types of germs can cause it: viruses and bacteria.  People who have a sore throat caused by a virus do not usually need to see a doctor or nurse. But if you think that you might have been exposed to COVID-19, or if COVID-19 is common where you live, ask your doctor or nurse if you should be tested.  People who have a sore throat caused by bacteria might need to see a doctor or nurse. They might have a type of infection called strep throat. Only about 1 in 10 adults who seek medical care for sore throat have strep throat.  How can I tell if my sore throat is caused by a virus or strep throat? -- It is hard to tell the difference. But there are some clues to look for.  People who have a sore throat caused by a virus usually have other symptoms, such as:   Stuffy or runny nose   Itchy or red eyes   Cough  People who have COVID-19 can have a sore throat as their only symptom. Or they can have other symptoms such as fever, cough, trouble breathing, and problems with their sense of smell or taste. In most cases, the only way to know for sure if you have COVID-19 is to get tested.  People who have strep throat do not usually have a cough, runny nose, or itchy or red eyes. They might have:   Severe throat pain   Fever (temperature higher  "than 100.4°F or 38°C)   Swollen glands in the neck  If you think that you have strep throat, the doctor or nurse can easily check. They can take a sample from the back of your throat and test it for the bacteria that cause strep throat.  Do I need antibiotics? -- If you have an infection caused by a virus, you do not need antibiotics. But if you have strep throat, you should get antibiotics. Most people with strep throat get better without antibiotics, but doctors and nurses often prescribe them. This is because antibiotics often can prevent other problems that might be caused by strep throat. Plus, antibiotics can reduce the symptoms of strep throat and prevent you from spreading it to other people.  What can I do to feel better? -- To relieve the pain of sore throat, you can:   Take over-the-counter pain medicine - Acetaminophen (sample brand name: Tylenol) or ibuprofen (sample brand names: Advil, Motrin) can help with throat pain.   Use sore throat lozenges or sprays - Using medicated sore throat lozenges or throat sprays can temporarily reduce throat pain.   Suck on hard candies, ice chips, or ice pops.   Gargle with salt water - Some people find that this helps with throat pain.   Use a cool mist humidifier - This adds moisture to the air to keep the throat from getting too dry and might help with pain.   Avoid smoking or being around people who are smoking - Smoke can make throat pain worse.  When can I go back to work or school? -- If you have strep throat, wait 1 day after starting antibiotics. By then, you will be a lot less likely to spread the infection to others.  If you have COVID-19, stay home from work or school and \"self-isolate\" until your doctor or nurse tells you it's safe to stop. Self-isolation means staying apart from other people, even the people you live with. When you can stop self-isolation depends on how long it has been since you had symptoms, and in some cases, whether you have had a " negative test (showing that the virus is no longer in your body).  If you have a sore throat that is not due to strep throat or COVID-19, you can go back to your usual activities as soon as you feel well. But it's still important to wash your hands often and cover your mouth if you cough.  When should I call the doctor? -- Call your doctor or nurse if:   You have a fever of at least 101°F (38.4°C).   Your throat pain is severe within the first 2 days, or does not start to improve within 5 to 7 days.   You are having trouble getting enough to eat or drink.   You got antibiotics but still have symptoms after finishing them.  Call for an ambulance (in the [place] and [place], call 9-1-1) or go to the emergency department if you:   Have trouble breathing   Are drooling because you cannot swallow your saliva   Have swelling of the neck or tongue   Cannot move your neck, or have trouble opening your mouth  What can I do to prevent getting a sore throat again? -- Wash your hands often with soap and water (figure 1). It is one of the best ways to prevent the spread of infection.  All topics are updated as new evidence becomes available and our peer review process is complete.  This topic retrieved from [employer] on: Mar 13, 2024.  Topic 02034 Version 23.0  Release: 32.2.4 - C32.71  © 2024 UpToDate, Inc. and/or its affiliates. All rights reserved.  figure 1: How to wash your hands     Wet your hands with clean water, and apply a small amount of soap. Lather and rub hands together for at least 20 seconds. Clean your wrists, palms, backs of your hands, between your fingers, tips of your fingers, thumbs, and under and around your nails. Rinse well, and dry your hands using a clean towel.  Graphic 468294 Version 7.0  Consumer Information Use and Disclaimer   Disclaimer: This generalized information is a limited summary of diagnosis, treatment, and/or medication information. It is not meant to be comprehensive and should be used as a  tool to help the user understand and/or assess potential diagnostic and treatment options. It does NOT include all information about conditions, treatments, medications, side effects, or risks that may apply to a specific patient. It is not intended to be medical advice or a substitute for the medical advice, diagnosis, or treatment of a health care provider based on the health care provider's examination and assessment of a patient's specific and unique circumstances. Patients must speak with a health care provider for complete information about their health, medical questions, and treatment options, including any risks or benefits regarding use of medications. This information does not endorse any treatments or medications as safe, effective, or approved for treating a specific patient. UpToDate, Inc. and its affiliates disclaim any warranty or liability relating to this information or the use thereof.The use of this information is governed by the Terms of Use, available at https://www.[employer].com/en/know/clinical-effectiveness-terms. 2024© UpToDate, Inc. and its affiliates and/or licensors. All rights reserved.  Copyright   © 2024 UpToDate, Inc. and/or its affiliates. All rights reserved.

## 2024-08-27 ENCOUNTER — APPOINTMENT (OUTPATIENT)
Dept: LAB | Facility: HOSPITAL | Age: 29
End: 2024-08-27
Payer: COMMERCIAL

## 2024-08-27 DIAGNOSIS — E11.65 INADEQUATELY CONTROLLED DIABETES MELLITUS (HCC): ICD-10-CM

## 2024-08-27 LAB
ALBUMIN SERPL BCG-MCNC: 4.5 G/DL (ref 3.5–5)
ALP SERPL-CCNC: 74 U/L (ref 34–104)
ALT SERPL W P-5'-P-CCNC: 23 U/L (ref 7–52)
ANION GAP SERPL CALCULATED.3IONS-SCNC: 8 MMOL/L (ref 4–13)
AST SERPL W P-5'-P-CCNC: 14 U/L (ref 13–39)
BILIRUB SERPL-MCNC: 0.65 MG/DL (ref 0.2–1)
BUN SERPL-MCNC: 14 MG/DL (ref 5–25)
CALCIUM SERPL-MCNC: 9.7 MG/DL (ref 8.4–10.2)
CHLORIDE SERPL-SCNC: 104 MMOL/L (ref 96–108)
CHOLEST SERPL-MCNC: 164 MG/DL
CO2 SERPL-SCNC: 25 MMOL/L (ref 21–32)
CREAT SERPL-MCNC: 0.69 MG/DL (ref 0.6–1.3)
EST. AVERAGE GLUCOSE BLD GHB EST-MCNC: 123 MG/DL
GFR SERPL CREATININE-BSD FRML MDRD: 118 ML/MIN/1.73SQ M
GLUCOSE P FAST SERPL-MCNC: 121 MG/DL (ref 65–99)
HBA1C MFR BLD: 5.9 %
HDLC SERPL-MCNC: 53 MG/DL
LDLC SERPL CALC-MCNC: 95 MG/DL (ref 0–100)
NONHDLC SERPL-MCNC: 111 MG/DL
POTASSIUM SERPL-SCNC: 4.1 MMOL/L (ref 3.5–5.3)
PROT SERPL-MCNC: 7.4 G/DL (ref 6.4–8.4)
SODIUM SERPL-SCNC: 137 MMOL/L (ref 135–147)
TRIGL SERPL-MCNC: 81 MG/DL

## 2024-08-27 PROCEDURE — 36415 COLL VENOUS BLD VENIPUNCTURE: CPT

## 2024-08-27 PROCEDURE — 80053 COMPREHEN METABOLIC PANEL: CPT

## 2024-08-27 PROCEDURE — 83036 HEMOGLOBIN GLYCOSYLATED A1C: CPT

## 2024-08-27 PROCEDURE — 80061 LIPID PANEL: CPT

## 2024-09-03 LAB
F2 GENE MUT ANL BLD/T: NORMAL
Lab: NORMAL

## 2024-09-06 ENCOUNTER — HOSPITAL ENCOUNTER (OUTPATIENT)
Dept: ULTRASOUND IMAGING | Facility: HOSPITAL | Age: 29
End: 2024-09-06
Payer: COMMERCIAL

## 2024-09-06 DIAGNOSIS — K74.60 HEPATIC CIRRHOSIS, UNSPECIFIED HEPATIC CIRRHOSIS TYPE, UNSPECIFIED WHETHER ASCITES PRESENT (HCC): ICD-10-CM

## 2024-09-06 PROCEDURE — 76705 ECHO EXAM OF ABDOMEN: CPT

## 2024-10-07 NOTE — PROGRESS NOTES
"Pulmonary Follow Up Note   Kenneth Abreu 29 y.o. female MRN: [id number]  10/8/2024    Assessment:  Moderate persistent asthma  Likely nonallergic type II, suspect from overweight  Negative [place] allergy panel  +hx allergic rhinitis, seasonal allergies during the fall and asthma as a child  PFT with significant BD response at the level of FEV1, air trapping and normal DLCO  Avoided beta agonists due to hx SVT in the past  Noted improvement of the chest tightness, cough and wheezing with PRN levalbuterol    Plan:  Okay to continue on PRN Xopenex only  Advised to use it once or twice a day and more frequently if had recurrence of symptoms  Hold off Flovent for now, given the intermittent hoarseness of voice  Up-to-date on immunization    Obstructive sleep apnea  Last seen by sleep medicine at [facility] in 2/2022  At that time was using the CPAP however stopped using it after lost approximately 50 pounds  Reported improvement of snoring since lost weight  However, regained a lot of weight back to be class II obesity BMI of 38 currently  Now with daytime fatigue, excessive sleepiness and unrefreshing sleep  Scheduled for sleep study in February 2025    Return in about 4 months (around 2/8/2025).    History of Present Illness     Follow up for: Asthma     Background:  As per initial consult notes     \"29 y.o. female with a h/o tetralogy of Fallot, pulmonary atresia, s/p catheter directed TPVR, ASD, liver cirrhosis, IBS, DM2, class I obesity, CHERRI, hypothyroidism, Nissen fundoplication     Hospitalized at birth to [facility] for tetralogy of Fallot, had open heart surgery/surgical repair and placed on mechanical ventilation for prolonged time.  Reports developing restrictive lung disease after that.     Most recent procedure 5/2022 underwent successful catheter directed placement of pulmonary valve.  Follows with [facility] for advanced/congenital heart disease center.  Also on surveillance by hepatology " "therefore liver cirrhosis/fibrosis thought to be from hepatic congestion.     Referred to pulmonary for evaluation of occasional dyspnea and wheezing with exertion noted since 11/2023.     Symptoms started with feeling tired, fatigue and cough tested positive for COVID-19.  Then started to develop wheezing associated with MOE, slightly improved since then but not resolved.  Sometimes at rest.  No associated chest congestion, cough.  No orthopnea, or PND.  Known to have intermittent lower extremity edema but nothing out of baseline.  Also reported SpO2 dropped to 88% few times measured by her pulse oximetry.     Denies any change in exercise capacity, still active/independent doing work at home intermittently without limitations.  No fever, chills, night sweats.  No voice changes, dysphagia or aspiration.  No allergic rhinitis symptoms currently.     Reports remote history of mild asthma as a child, told to be exercise-induced, was on inhalers/nebulizer until age of 10 and then discontinued.  Also reports seasonal allergy/allergic rhinitis around the time of the fall including nasal congestion, cough and sinusitis like symptoms however none of the symptoms developed recently.     Seen by sleep medicine at [facility] in 2/2022, diagnosed with CHERRI.  At that time intentionally lost about 50 pounds.  Reported improvement of the snoring then discontinued the CPAP therapy.     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  Negative " "[facility] allergy panel/HP panel    7/2024 visit-ordered Xopenex HFA, given a list of ICS to check with insurance which 1 is covered, in lab sleep study.  Nebulizer/supplies was PRN Xopenex    Interval History    Tested positive for COVID-19 at home 8/2024, presented with a sore throat seen at the urgent care clinic and prescribed prednisone.    Since last visit, started to use Xopenex nebs/HFA, noted a good improvement including less chest tightness, easier to breathe, less cough frequency and wheezing.  States that she is using them few times per week.  Use the Flovent only for 3 days then stopped.    Review of Systems  As per hpi, all other systems reviewed and were negative    Studies:  Imaging and other studies: I have personally reviewed pertinent films in PACS        Pulmonary function testing:   PFT 4/12/2024-ratio 74%, FEV1 1.54 L / 47%, FVC 2.08 L / 54%, TLC 93%, %, DLCO 117%.  10% increase of FEV1 following BD administration     6-minute walk test 3/27/2024-baseline SpO2 97% on room air, heart rate 103, able to walk 240 m in 6 minutes, lowest SpO2 at 94%, maximal heart rate 123, no significant oxygen desaturation     EKG, Pathology, and Other Studies: I have personally reviewed pertinent reports.            Past medical, surgical, social and family histories reviewed.      Medications/Allergies: Reviewed      Vitals: Blood pressure 140/76, pulse 84, temperature 98.2 °F (36.8 °C), height 5' 4\" (1.626 m), weight 103 kg (226 lb), SpO2 97%, not currently breastfeeding. Body mass index is 38.79 kg/m². Oxygen Therapy  SpO2: 97 %      Physical Exam  Body mass index is 38.79 kg/m².   Gen: not in acute distress, obese  Neck/Eyes: supple, no adenopathy, PERRL  Ear: normal appearance, no significant hearing impairment  Nose:  normal nasal mucosa, no drainage  Mouth:  unremarkable/normal appearance of lips  Oropharynx: mucosa is moist, no focal lesions or erythema  Salivary glands: soft nontender  Chest: normal " "respiratory efforts, clear breath sounds bilaterally  CV: RRR, S2 split no murmurs appreciated, no edema  Abdomen: soft, non tender  Extremities:  No observed deformity   Skin: unremarkable  Neuro: AAO X3, no focal motor deficit        Labs:  Lab Results   Component Value Date    WBC 6.90 03/06/2024    HGB 16.0 (H) 03/06/2024    HCT 46.4 (H) 03/06/2024    MCV 84 03/06/2024     03/06/2024     Lab Results   Component Value Date    GLUCOSE 79 03/14/2015    CALCIUM 9.7 08/27/2024     03/14/2015    K 4.1 08/27/2024    CO2 25 08/27/2024     08/27/2024    BUN 14 08/27/2024    CREATININE 0.69 08/27/2024     Lab Results   Component Value Date    IGE 14.8 04/03/2024     Lab Results   Component Value Date    ALT 23 08/27/2024    AST 14 08/27/2024    ALKPHOS 74 08/27/2024    BILITOT 1.2 (H) 03/14/2015           Portions of the record may have been created with voice recognition software.  Occasional wrong word or \"sound a like\" substitutions may have occurred due to the inherent limitations of voice recognition software.  Read the chart carefully and recognize, using context, where substitutions have occurred    Dany Musa M.D.  [facility]  Answers submitted by the patient for this visit:  Pulmonology Questionnaire (Submitted on 10/3/2024)  Chief Complaint: Primary symptoms  Do you have chest tightness?: Yes  Do you have a hoarse voice?: Yes  When did you first notice your symptoms?: 1 to 4 weeks ago  How often do your symptoms occur?: daily  Since you first noticed this problem, how has it changed?: gradually improving  Do you have shortness of breath that occurs with effort or exertion?: Yes  Do you have ear congestion?: No  Do you have heartburn?: Yes  Do you have fatigue?: Yes  Do you have nasal congestion?: No  Do you have shortness of breath when lying flat?: Yes  Do you have shortness of breath when you wake up?: Yes  Do you have sweats?: No  Have you " experienced weight loss?: No  Which of the following makes your symptoms worse?: nothing  Which of the following makes your symptoms better?: nothing  Risk factors for lung disease: animal exposure

## 2024-10-08 ENCOUNTER — OFFICE VISIT (OUTPATIENT)
Dept: PULMONOLOGY | Facility: CLINIC | Age: 29
End: 2024-10-08

## 2024-10-08 VITALS
BODY MASS INDEX: 38.58 KG/M2 | SYSTOLIC BLOOD PRESSURE: 140 MMHG | WEIGHT: 226 LBS | DIASTOLIC BLOOD PRESSURE: 76 MMHG | HEART RATE: 84 BPM | OXYGEN SATURATION: 97 % | HEIGHT: 64 IN | TEMPERATURE: 98.2 F

## 2024-10-08 DIAGNOSIS — G47.33 OSA (OBSTRUCTIVE SLEEP APNEA): ICD-10-CM

## 2024-10-08 DIAGNOSIS — J45.40 MODERATE PERSISTENT ASTHMA WITHOUT COMPLICATION: Primary | ICD-10-CM

## 2024-10-30 ENCOUNTER — HOSPITAL ENCOUNTER (OUTPATIENT)
Dept: ULTRASOUND IMAGING | Facility: HOSPITAL | Age: 29
Discharge: HOME | End: 2024-10-30
Payer: COMMERCIAL

## 2024-10-30 DIAGNOSIS — R13.10 DYSPHAGIA, UNSPECIFIED: ICD-10-CM

## 2024-10-30 DIAGNOSIS — R09.A2 FOREIGN BODY SENSATION, THROAT: ICD-10-CM

## 2024-10-30 PROCEDURE — 76536 US EXAM OF HEAD AND NECK: CPT

## 2024-12-02 ENCOUNTER — OFFICE VISIT (OUTPATIENT)
Dept: URGENT CARE | Facility: CLINIC | Age: 29
End: 2024-12-02
Payer: COMMERCIAL

## 2024-12-02 VITALS
HEIGHT: 64 IN | WEIGHT: 225.2 LBS | BODY MASS INDEX: 38.45 KG/M2 | HEART RATE: 81 BPM | SYSTOLIC BLOOD PRESSURE: 135 MMHG | RESPIRATION RATE: 20 BRPM | DIASTOLIC BLOOD PRESSURE: 86 MMHG | OXYGEN SATURATION: 95 % | TEMPERATURE: 97.8 F

## 2024-12-02 DIAGNOSIS — J20.9 ACUTE BRONCHITIS, UNSPECIFIED ORGANISM: Primary | ICD-10-CM

## 2024-12-02 PROCEDURE — 99213 OFFICE O/P EST LOW 20 MIN: CPT

## 2024-12-02 RX ORDER — AZITHROMYCIN 250 MG/1
TABLET, FILM COATED ORAL
Qty: 6 TABLET | Refills: 0 | Status: CANCELLED | OUTPATIENT
Start: 2024-12-02 | End: 2024-12-06

## 2024-12-02 RX ORDER — METHYLPREDNISOLONE 4 MG/1
TABLET ORAL
Qty: 1 EACH | Refills: 0 | Status: SHIPPED | OUTPATIENT
Start: 2024-12-02

## 2024-12-03 NOTE — PATIENT INSTRUCTIONS
Take antibiotics as prescribed, being sure to complete full course of therapy even if you feel better!    Eat yogurt with live and active cultures and/or take a probiotic at least 3 hours before or after antibiotic dose. Monitor stool for diarrhea and/or blood. If this occurs, contact primary care doctor ASAP.       Take course of steroids as prescribed. Be sure to take with food! Recommended to take in the morning, as taking this medication later in the day may cause sleep disturbance (keeping you awake). If diabetic, be sure to monitor your blood glucose levels while on this medication and notify PCP if levels become too elevated.     Follow up with PCP in 3-5 days.  Proceed to  ER if symptoms worsen.    If tests have been performed at [facility], our office will contact you with results if changes need to be made to the care plan discussed with you at the visit.  You can review your full results on [facility] MyChart.

## 2024-12-03 NOTE — PROGRESS NOTES
[facility]        NAME: Kenneth Abreu is a 29 y.o. female  : 1995    MRN: [id number]  DATE: 2024  TIME: 7:40 PM    Assessment and Plan   Acute bronchitis, unspecified organism [J20.9]  1. Acute bronchitis, unspecified organism  methylPREDNISolone 4 MG tablet therapy pack    amoxicillin-clavulanate (AUGMENTIN) 875-125 mg per tablet        Will plan to treat with steroid course as well as ABX at this time. Patient is diabetic with disease controlled. Last HA1C on 24 was 5.9. Instructed to monitor BG levels closely while being on steroid therapy. Patient verbalized understanding of d/c instructions.     Patient Instructions   Take antibiotics as prescribed, being sure to complete full course of therapy even if you feel better!    Eat yogurt with live and active cultures and/or take a probiotic at least 3 hours before or after antibiotic dose. Monitor stool for diarrhea and/or blood. If this occurs, contact primary care doctor ASAP.       Take course of steroids as prescribed. Be sure to take with food! Recommended to take in the morning, as taking this medication later in the day may cause sleep disturbance (keeping you awake). If diabetic, be sure to monitor your blood glucose levels while on this medication and notify PCP if levels become too elevated.     Follow up with PCP in 3-5 days.  Proceed to  ER if symptoms worsen.    If tests have been performed at [facility], our office will contact you with results if changes need to be made to the care plan discussed with you at the visit.  [facility]    Chief Complaint     Chief Complaint   Patient presents with    Cold Like Symptoms     Cough, stuffy nose, and ear blockage starting 2 weeks ago. Reporting rib pain d/t coughing. Taking robitussin.          History of Present Illness       Negative COVID tests x2  Multiple sick contacts within family that she was exposed to.    URI   This is a new  problem. The current episode started 1 to 4 weeks ago. The problem has been unchanged. There has been no fever. Associated symptoms include congestion, coughing, a plugged ear sensation and rhinorrhea. Pertinent negatives include no abdominal pain, chest pain, ear pain, headaches, nausea, rash, sinus pain, sore throat or vomiting. Treatments tried: Robitussin & Levalbuterol Nebulizer. The treatment provided no relief.       Review of Systems   Review of Systems   Constitutional:  Negative for chills, fatigue and fever.   HENT:  Positive for congestion and rhinorrhea. Negative for ear pain, sinus pain and sore throat.         Ear fullness   Respiratory:  Positive for cough. Negative for chest tightness and shortness of breath.    Cardiovascular:  Negative for chest pain and palpitations.   Gastrointestinal:  Negative for abdominal pain, nausea and vomiting.   Musculoskeletal:  Negative for myalgias.   Skin:  Negative for pallor and rash.   Neurological:  Negative for dizziness, weakness, light-headedness and headaches.   All other systems reviewed and are negative.        Current Medications       Current Outpatient Medications:     amoxicillin-clavulanate (AUGMENTIN) 875-125 mg per tablet, Take 1 tablet by mouth every 12 (twelve) hours for 5 days, Disp: 10 tablet, Rfl: 0    aspirin 325 mg tablet, Take 325 mg by mouth daily, Disp: , Rfl:     buPROPion (WELLBUTRIN XL) 150 mg 24 hr tablet, , Disp: , Rfl:     buPROPion (WELLBUTRIN XL) 300 mg 24 hr tablet, , Disp: , Rfl:     Dextromethorphan Polistirex (ROBITUSSIN 12 HOUR COUGH PO), Take by mouth, Disp: , Rfl:     fluticasone (Flovent HFA) 110 MCG/ACT inhaler, Inhale 2 puffs 2 (two) times a day Rinse mouth after use., Disp: 36 g, Rfl: 5    hydrochlorothiazide (HYDRODIURIL) 12.5 mg tablet, Take 12.5 mg by mouth daily, Disp: , Rfl:     levalbuterol (Xopenex HFA) 45 mcg/act inhaler, Inhale 1-2 puffs every 4 (four) hours, Disp: 15 g, Rfl: 5    levothyroxine 25 mcg tablet, ,  "Disp: , Rfl:     methylPREDNISolone 4 MG tablet therapy pack, Use as directed on package, Disp: 1 each, Rfl: 0    metoprolol succinate (TOPROL-XL) 25 mg 24 hr tablet, , Disp: , Rfl:     metoprolol tartrate (LOPRESSOR) 25 mg tablet, Take 25 mg by mouth as needed \"Prn for cardiac episodes\", Disp: , Rfl:     omeprazole (PriLOSEC) 20 mg delayed release capsule, Take by mouth, Disp: , Rfl:     ondansetron (ZOFRAN) 4 mg tablet, every 6 (six) hours as needed, Disp: , Rfl:     potassium chloride (MICRO-K) 10 MEQ CR capsule, Take 2 capsules (20 mEq total) by mouth daily, Disp: 60 capsule, Rfl: 0    spironolactone (ALDACTONE) 25 mg tablet, Take 25 mg by mouth daily, Disp: , Rfl:     digoxin (LANOXIN) 0.25 mg tablet, Take 250 mcg by mouth every morning  (Patient not taking: Reported on 12/2/2024), Disp: , Rfl:     digoxin (LANOXIN) 0.25 mg tablet, Take 125 mcg by mouth every evening  (Patient not taking: Reported on 12/2/2024), Disp: , Rfl:     Current Allergies     Allergies as of 12/02/2024 - Reviewed 12/02/2024   Allergen Reaction Noted    Cholestatin Other (See Comments) and Hives 02/12/2022    Procainamide Other (See Comments) and Rash 10/16/2010            The following portions of the patient's history were reviewed and updated as appropriate: allergies, current medications, past family history, past medical history, past social history, past surgical history and problem list.     Past Medical History:   Diagnosis Date    ASD (atrial septal defect)     Asthma     Cirrhosis of liver (HCC)     COVID-19 11/2023    Diabetes mellitus (HCC)     History of transfusion     IBS (irritable bowel syndrome)     Pulmonary atresia     SVT (supraventricular tachycardia) (HCC)        Past Surgical History:   Procedure Laterality Date    CARDIAC SURGERY      NISSEN FUNDOPLICATION      as an infant    CT EXCISION TUMOR SOFT TISSUE BACK/FLANK SUBQ <3CM Right 5/10/2024    Procedure: BACK LIPOMA EXCISION (2 X 1CM);  Surgeon: Vida Son " "Delfina, DO;  Location:  MAIN OR;  Service: General       Family History   Problem Relation Age of Onset    Diabetes Mother     No Known Problems Father     No Known Problems Maternal Grandmother     No Known Problems Paternal Grandmother     Lymphoma Maternal Grandfather         non-Hodgkin's lymphoma    No Known Problems Paternal Grandfather     No Known Problems Maternal Aunt     No Known Problems Maternal Aunt     No Known Problems Paternal Aunt     Pancreatic cancer Maternal Uncle     BRCA2 Positive Neg Hx     BRCA2 Negative Neg Hx     BRCA1 Positive Neg Hx     BRCA1 Negative Neg Hx     BRCA 1/2 Neg Hx     Ovarian cancer Neg Hx     Endometrial cancer Neg Hx     Colon cancer Neg Hx     Breast cancer additional onset Neg Hx     Breast cancer Neg Hx          Medications have been verified.        Objective   /86   Pulse 81   Temp 97.8 °F (36.6 °C)   Resp 20   Ht 5' 4\" (1.626 m)   Wt 102 kg (225 lb 3.2 oz)   LMP 11/05/2024   SpO2 95%   BMI 38.66 kg/m²   Patient's last menstrual period was 11/05/2024.       Physical Exam     Physical Exam  Vitals and nursing note reviewed.   Constitutional:       Appearance: Normal appearance. She is ill-appearing.   HENT:      Right Ear: Ear canal and external ear normal. Tympanic membrane is injected.      Left Ear: Ear canal and external ear normal. Tympanic membrane is injected.      Nose: Congestion and rhinorrhea present.      Mouth/Throat:      Mouth: Mucous membranes are moist.      Pharynx: Oropharynx is clear. No oropharyngeal exudate or posterior oropharyngeal erythema.   Eyes:      Extraocular Movements: Extraocular movements intact.      Conjunctiva/sclera: Conjunctivae normal.      Pupils: Pupils are equal, round, and reactive to light.   Cardiovascular:      Rate and Rhythm: Normal rate and regular rhythm.      Pulses: Normal pulses.      Heart sounds: Murmur heard.   Pulmonary:      Effort: Pulmonary effort is normal. No respiratory distress.      " Breath sounds: No stridor. Examination of the right-lower field reveals decreased breath sounds. Examination of the left-lower field reveals decreased breath sounds. Decreased breath sounds present. No wheezing, rhonchi or rales.      Comments: Slightly diminished in bilateral bases.  Chest:      Chest wall: No tenderness.   Abdominal:      General: Abdomen is flat.      Palpations: Abdomen is soft.   Musculoskeletal:         General: Normal range of motion.      Cervical back: Normal range of motion and neck supple. No tenderness.   Lymphadenopathy:      Cervical: No cervical adenopathy.   Skin:     General: Skin is warm and dry.      Capillary Refill: Capillary refill takes less than 2 seconds.      Coloration: Skin is not pale.      Findings: No erythema or rash.   Neurological:      General: No focal deficit present.      Mental Status: She is alert. Mental status is at baseline.   Psychiatric:         Mood and Affect: Mood normal.         Behavior: Behavior normal.         Thought Content: Thought content normal.         Judgment: Judgment normal.

## 2024-12-11 ENCOUNTER — HOSPITAL ENCOUNTER (OUTPATIENT)
Dept: RADIOLOGY | Facility: HOSPITAL | Age: 29
Discharge: HOME | End: 2024-12-11
Payer: COMMERCIAL

## 2024-12-11 DIAGNOSIS — R10.9 ABDOMINAL PAIN, UNSPECIFIED ABDOMINAL LOCATION: ICD-10-CM

## 2024-12-11 DIAGNOSIS — R06.00 DYSPNEA, UNSPECIFIED TYPE: ICD-10-CM

## 2024-12-11 PROCEDURE — 71046 X-RAY EXAM CHEST 2 VIEWS: CPT

## 2024-12-11 PROCEDURE — 74018 RADEX ABDOMEN 1 VIEW: CPT

## 2024-12-16 ENCOUNTER — HOSPITAL ENCOUNTER (EMERGENCY)
Facility: HOSPITAL | Age: 29
Discharge: HOME | End: 2024-12-16
Attending: EMERGENCY MEDICINE | Admitting: EMERGENCY MEDICINE
Payer: COMMERCIAL

## 2024-12-16 ENCOUNTER — APPOINTMENT (EMERGENCY)
Dept: ULTRASOUND IMAGING | Facility: HOSPITAL | Age: 29
End: 2024-12-16
Payer: COMMERCIAL

## 2024-12-16 ENCOUNTER — APPOINTMENT (EMERGENCY)
Dept: CT IMAGING | Facility: HOSPITAL | Age: 29
End: 2024-12-16
Payer: COMMERCIAL

## 2024-12-16 VITALS
BODY MASS INDEX: 39.27 KG/M2 | HEART RATE: 103 BPM | SYSTOLIC BLOOD PRESSURE: 129 MMHG | OXYGEN SATURATION: 97 % | WEIGHT: 230 LBS | DIASTOLIC BLOOD PRESSURE: 89 MMHG | HEIGHT: 64 IN | TEMPERATURE: 98 F | RESPIRATION RATE: 18 BRPM

## 2024-12-16 DIAGNOSIS — R10.11 RIGHT UPPER QUADRANT PAIN: Primary | ICD-10-CM

## 2024-12-16 LAB
ALBUMIN SERPL BCG-MCNC: 4.2 G/DL (ref 3.5–5)
ALP SERPL-CCNC: 84 U/L (ref 34–104)
ALT SERPL W P-5'-P-CCNC: 28 U/L (ref 7–52)
ANION GAP SERPL CALCULATED.3IONS-SCNC: 8 MMOL/L (ref 4–13)
APTT PPP: 25 SECONDS (ref 23–34)
AST SERPL W P-5'-P-CCNC: 28 U/L (ref 13–39)
BACTERIA UR QL AUTO: ABNORMAL /HPF
BASOPHILS # BLD AUTO: 0.02 THOUSANDS/ÂΜL (ref 0–0.1)
BASOPHILS NFR BLD AUTO: 0 % (ref 0–1)
BILIRUB SERPL-MCNC: 0.53 MG/DL (ref 0.2–1)
BILIRUB UR QL STRIP: NEGATIVE
BUN SERPL-MCNC: 10 MG/DL (ref 5–25)
CALCIUM SERPL-MCNC: 8.7 MG/DL (ref 8.4–10.2)
CARDIAC TROPONIN I PNL SERPL HS: <2 NG/L (ref ?–50)
CHLORIDE SERPL-SCNC: 106 MMOL/L (ref 96–108)
CLARITY UR: ABNORMAL
CO2 SERPL-SCNC: 24 MMOL/L (ref 21–32)
COLOR UR: YELLOW
CREAT SERPL-MCNC: 0.6 MG/DL (ref 0.6–1.3)
D DIMER PPP FEU-MCNC: 1.07 UG/ML FEU
EOSINOPHIL # BLD AUTO: 0.17 THOUSAND/ÂΜL (ref 0–0.61)
EOSINOPHIL NFR BLD AUTO: 2 % (ref 0–6)
ERYTHROCYTE [DISTWIDTH] IN BLOOD BY AUTOMATED COUNT: 12.1 % (ref 11.6–15.1)
EXT PREGNANCY TEST URINE: NEGATIVE
EXT. CONTROL: NORMAL
GFR SERPL CREATININE-BSD FRML MDRD: 123 ML/MIN/1.73SQ M
GLUCOSE SERPL-MCNC: 153 MG/DL (ref 65–140)
GLUCOSE UR STRIP-MCNC: NEGATIVE MG/DL
HCT VFR BLD AUTO: 43.1 % (ref 34.8–46.1)
HGB BLD-MCNC: 14.3 G/DL (ref 11.5–15.4)
HGB UR QL STRIP.AUTO: NEGATIVE
IMM GRANULOCYTES # BLD AUTO: 0.02 THOUSAND/UL (ref 0–0.2)
IMM GRANULOCYTES NFR BLD AUTO: 0 % (ref 0–2)
INR PPP: 0.98 (ref 0.85–1.19)
KETONES UR STRIP-MCNC: NEGATIVE MG/DL
LACTATE SERPL-SCNC: 1.8 MMOL/L (ref 0.5–2)
LEUKOCYTE ESTERASE UR QL STRIP: ABNORMAL
LIPASE SERPL-CCNC: 56 U/L (ref 11–82)
LYMPHOCYTES # BLD AUTO: 1.85 THOUSANDS/ÂΜL (ref 0.6–4.47)
LYMPHOCYTES NFR BLD AUTO: 25 % (ref 14–44)
MAGNESIUM SERPL-MCNC: 1.9 MG/DL (ref 1.9–2.7)
MCH RBC QN AUTO: 28.4 PG (ref 26.8–34.3)
MCHC RBC AUTO-ENTMCNC: 33.2 G/DL (ref 31.4–37.4)
MCV RBC AUTO: 86 FL (ref 82–98)
MONOCYTES # BLD AUTO: 0.53 THOUSAND/ÂΜL (ref 0.17–1.22)
MONOCYTES NFR BLD AUTO: 7 % (ref 4–12)
NEUTROPHILS # BLD AUTO: 4.86 THOUSANDS/ÂΜL (ref 1.85–7.62)
NEUTS SEG NFR BLD AUTO: 66 % (ref 43–75)
NITRITE UR QL STRIP: NEGATIVE
NON-SQ EPI CELLS URNS QL MICRO: ABNORMAL /HPF
NRBC BLD AUTO-RTO: 0 /100 WBCS
PH UR STRIP.AUTO: 5.5 [PH]
PLATELET # BLD AUTO: 218 THOUSANDS/UL (ref 149–390)
PMV BLD AUTO: 9.4 FL (ref 8.9–12.7)
POTASSIUM SERPL-SCNC: 4 MMOL/L (ref 3.5–5.3)
PROT SERPL-MCNC: 7.2 G/DL (ref 6.4–8.4)
PROT UR STRIP-MCNC: NEGATIVE MG/DL
PROTHROMBIN TIME: 13.4 SECONDS (ref 12.3–15)
RBC # BLD AUTO: 5.03 MILLION/UL (ref 3.81–5.12)
RBC #/AREA URNS AUTO: ABNORMAL /HPF
SODIUM SERPL-SCNC: 138 MMOL/L (ref 135–147)
SP GR UR STRIP.AUTO: 1.01 (ref 1–1.03)
UROBILINOGEN UR QL STRIP.AUTO: 0.2 E.U./DL
WBC # BLD AUTO: 7.45 THOUSAND/UL (ref 4.31–10.16)
WBC #/AREA URNS AUTO: ABNORMAL /HPF

## 2024-12-16 PROCEDURE — 83735 ASSAY OF MAGNESIUM: CPT | Performed by: EMERGENCY MEDICINE

## 2024-12-16 PROCEDURE — 81025 URINE PREGNANCY TEST: CPT | Performed by: EMERGENCY MEDICINE

## 2024-12-16 PROCEDURE — 85610 PROTHROMBIN TIME: CPT | Performed by: EMERGENCY MEDICINE

## 2024-12-16 PROCEDURE — 71275 CT ANGIOGRAPHY CHEST: CPT

## 2024-12-16 PROCEDURE — 83605 ASSAY OF LACTIC ACID: CPT | Performed by: EMERGENCY MEDICINE

## 2024-12-16 PROCEDURE — 76705 ECHO EXAM OF ABDOMEN: CPT

## 2024-12-16 PROCEDURE — 80053 COMPREHEN METABOLIC PANEL: CPT | Performed by: EMERGENCY MEDICINE

## 2024-12-16 PROCEDURE — 85379 FIBRIN DEGRADATION QUANT: CPT | Performed by: EMERGENCY MEDICINE

## 2024-12-16 PROCEDURE — 84484 ASSAY OF TROPONIN QUANT: CPT | Performed by: EMERGENCY MEDICINE

## 2024-12-16 PROCEDURE — 74177 CT ABD & PELVIS W/CONTRAST: CPT

## 2024-12-16 PROCEDURE — 99284 EMERGENCY DEPT VISIT MOD MDM: CPT

## 2024-12-16 PROCEDURE — 81001 URINALYSIS AUTO W/SCOPE: CPT | Performed by: EMERGENCY MEDICINE

## 2024-12-16 PROCEDURE — 93005 ELECTROCARDIOGRAM TRACING: CPT

## 2024-12-16 PROCEDURE — 96361 HYDRATE IV INFUSION ADD-ON: CPT

## 2024-12-16 PROCEDURE — 83690 ASSAY OF LIPASE: CPT | Performed by: EMERGENCY MEDICINE

## 2024-12-16 PROCEDURE — 96374 THER/PROPH/DIAG INJ IV PUSH: CPT

## 2024-12-16 PROCEDURE — 99284 EMERGENCY DEPT VISIT MOD MDM: CPT | Performed by: EMERGENCY MEDICINE

## 2024-12-16 PROCEDURE — 36415 COLL VENOUS BLD VENIPUNCTURE: CPT | Performed by: EMERGENCY MEDICINE

## 2024-12-16 PROCEDURE — 85730 THROMBOPLASTIN TIME PARTIAL: CPT | Performed by: EMERGENCY MEDICINE

## 2024-12-16 PROCEDURE — 87086 URINE CULTURE/COLONY COUNT: CPT | Performed by: EMERGENCY MEDICINE

## 2024-12-16 PROCEDURE — 85025 COMPLETE CBC W/AUTO DIFF WBC: CPT | Performed by: EMERGENCY MEDICINE

## 2024-12-16 RX ORDER — PANTOPRAZOLE SODIUM 40 MG/1
40 TABLET, DELAYED RELEASE ORAL ONCE
Status: COMPLETED | OUTPATIENT
Start: 2024-12-16 | End: 2024-12-16

## 2024-12-16 RX ORDER — PANTOPRAZOLE SODIUM 20 MG/1
20 TABLET, DELAYED RELEASE ORAL DAILY
Qty: 20 TABLET | Refills: 0 | Status: SHIPPED | OUTPATIENT
Start: 2024-12-16

## 2024-12-16 RX ORDER — KETOROLAC TROMETHAMINE 30 MG/ML
15 INJECTION, SOLUTION INTRAMUSCULAR; INTRAVENOUS ONCE
Status: COMPLETED | OUTPATIENT
Start: 2024-12-16 | End: 2024-12-16

## 2024-12-16 RX ADMIN — IOHEXOL 100 ML: 350 INJECTION, SOLUTION INTRAVENOUS at 21:32

## 2024-12-16 RX ADMIN — PANTOPRAZOLE SODIUM 40 MG: 40 TABLET, DELAYED RELEASE ORAL at 22:13

## 2024-12-16 RX ADMIN — KETOROLAC TROMETHAMINE 15 MG: 30 INJECTION, SOLUTION INTRAMUSCULAR; INTRAVENOUS at 20:44

## 2024-12-16 RX ADMIN — SODIUM CHLORIDE 1000 ML: 0.9 INJECTION, SOLUTION INTRAVENOUS at 20:45

## 2024-12-17 LAB
ATRIAL RATE: 109 BPM
P AXIS: 32 DEGREES
PR INTERVAL: 156 MS
QRS AXIS: 86 DEGREES
QRSD INTERVAL: 122 MS
QT INTERVAL: 366 MS
QTC INTERVAL: 492 MS
T WAVE AXIS: 52 DEGREES
VENTRICULAR RATE: 109 BPM

## 2024-12-17 PROCEDURE — 93010 ELECTROCARDIOGRAM REPORT: CPT | Performed by: INTERNAL MEDICINE

## 2024-12-17 NOTE — ED PROVIDER NOTES
ED Disposition       None          Assessment & Plan       Medical Decision Making  Amount and/or Complexity of Data Reviewed  Labs: ordered. Decision-making details documented in ED Course.  Radiology: ordered. Decision-making details documented in ED Course.    Risk  Prescription drug management.             Medications   sodium chloride 0.9 % bolus 1,000 mL (has no administration in time range)   ketorolac (TORADOL) injection 15 mg (has no administration in time range)       ED Risk Strat Scores                                              History of Present Illness       Chief Complaint   Patient presents with    Abdominal Pain     Pt started with abdominal pain tonight. Denies fever, nausea or vomiting. Abdominal pain started after eating diinner tonight.        Past Medical History:   Diagnosis Date    ASD (atrial septal defect)     Asthma     Cirrhosis of liver (HCC)     COVID-19 11/2023    Diabetes mellitus (HCC)     History of transfusion     IBS (irritable bowel syndrome)     Pulmonary atresia     SVT (supraventricular tachycardia) (HCC)       Past Surgical History:   Procedure Laterality Date    CARDIAC SURGERY      NISSEN FUNDOPLICATION      as an infant    DC EXCISION TUMOR SOFT TISSUE BACK/FLANK SUBQ <3CM Right 5/10/2024    Procedure: BACK LIPOMA EXCISION (2 X 1CM);  Surgeon: Vida Mathis DO;  Location:  MAIN OR;  Service: General      Family History   Problem Relation Age of Onset    Diabetes Mother     No Known Problems Father     No Known Problems Maternal Grandmother     No Known Problems Paternal Grandmother     Lymphoma Maternal Grandfather         non-Hodgkin's lymphoma    No Known Problems Paternal Grandfather     No Known Problems Maternal Aunt     No Known Problems Maternal Aunt     No Known Problems Paternal Aunt     Pancreatic cancer Maternal Uncle     BRCA2 Positive Neg Hx     BRCA2 Negative Neg Hx     BRCA1 Positive Neg Hx     BRCA1 Negative Neg Hx     BRCA 1/2 Neg Hx      Ovarian cancer Neg Hx     Endometrial cancer Neg Hx     Colon cancer Neg Hx     Breast cancer additional onset Neg Hx     Breast cancer Neg Hx       Social History     Tobacco Use    Smoking status: Never    Smokeless tobacco: Never   Vaping Use    Vaping status: Never Used   Substance Use Topics    Alcohol use: Yes     Alcohol/week: 2.0 standard drinks of alcohol     Types: 2 Standard drinks or equivalent per week     Comment: social    Drug use: Never      E-Cigarette/Vaping    E-Cigarette Use Never User       E-Cigarette/Vaping Substances      I have reviewed and agree with the history as documented.     Patient been having intermittent right upper quadrant pain without radiation for the past few weeks.  Started today and again.  Took diclofenac without any relief.  Slight nausea.  No vomiting.  History of Rickey fundoplication.  No history of gallbladder problems.  Currently has a cough.      History provided by:  Patient   used: No    Abdominal Pain  Pain location:  RUQ  Pain quality: aching    Pain radiates to:  Does not radiate  Pain severity:  Mild  Onset quality:  Gradual  Duration:  3 weeks  Timing:  Intermittent  Progression:  Worsening  Chronicity:  New  Context: previous surgery    Context: not alcohol use, not diet changes, not sick contacts and not trauma    Relieved by:  Nothing  Worsened by:  Nothing  Ineffective treatments:  None tried  Associated symptoms: anorexia and nausea    Associated symptoms: no chest pain, no chills, no constipation, no cough, no diarrhea, no dysuria, no fever, no hematuria, no shortness of breath, no sore throat and no vomiting        Review of Systems   Constitutional:  Negative for chills and fever.   HENT:  Negative for ear pain, hearing loss, sore throat, trouble swallowing and voice change.    Eyes:  Negative for pain and discharge.   Respiratory:  Negative for cough, shortness of breath and wheezing.    Cardiovascular:  Negative for chest pain  and palpitations.   Gastrointestinal:  Positive for abdominal pain, anorexia and nausea. Negative for blood in stool, constipation, diarrhea and vomiting.   Genitourinary:  Negative for dysuria, flank pain, frequency and hematuria.   Musculoskeletal:  Negative for joint swelling, neck pain and neck stiffness.   Skin:  Negative for rash and wound.   Neurological:  Negative for dizziness, seizures, syncope, facial asymmetry and headaches.   Psychiatric/Behavioral:  Negative for hallucinations, self-injury and suicidal ideas.    All other systems reviewed and are negative.          Objective       ED Triage Vitals [12/16/24 2029]   Temperature Pulse Blood Pressure Respirations SpO2 Patient Position - Orthostatic VS   98 °F (36.7 °C) (!) 120 (!) 198/88 18 96 % --      Temp Source Heart Rate Source BP Location FiO2 (%) Pain Score    Temporal Monitor Right arm -- 10 - Worst Possible Pain      Vitals      Date and Time Temp Pulse SpO2 Resp BP Pain Score FACES Pain Rating User   12/16/24 2029 98 °F (36.7 °C) 120 96 % 18 198/88 10 - Worst Possible Pain -- NM            Physical Exam  Vitals and nursing note reviewed.   Constitutional:       General: She is not in acute distress.     Appearance: She is well-developed.   HENT:      Head: Normocephalic and atraumatic.      Right Ear: External ear normal.      Left Ear: External ear normal.   Eyes:      General: No scleral icterus.        Right eye: No discharge.         Left eye: No discharge.      Extraocular Movements: Extraocular movements intact.      Conjunctiva/sclera: Conjunctivae normal.   Cardiovascular:      Rate and Rhythm: Normal rate and regular rhythm.      Heart sounds: Normal heart sounds. No murmur heard.  Pulmonary:      Effort: Pulmonary effort is normal.      Breath sounds: Normal breath sounds. No wheezing or rales.   Abdominal:      General: Bowel sounds are normal. There is no distension.      Palpations: Abdomen is soft.      Tenderness: There is  abdominal tenderness in the right upper quadrant. There is no guarding or rebound.   Musculoskeletal:         General: No deformity. Normal range of motion.      Cervical back: Normal range of motion and neck supple.   Skin:     General: Skin is warm and dry.      Findings: No rash.   Neurological:      General: No focal deficit present.      Mental Status: She is alert and oriented to person, place, and time.      Cranial Nerves: No cranial nerve deficit.   Psychiatric:         Mood and Affect: Mood normal.         Behavior: Behavior normal.         Thought Content: Thought content normal.         Judgment: Judgment normal.         Results Reviewed       None            No orders to display       ECG 12 Lead Documentation Only    Date/Time: 12/16/2024 9:01 PM    Performed by: Albin Mann MD  Authorized by: Albin Mann MD    ECG reviewed by me, the ED Provider: yes    Patient location:  ED  Previous ECG:     Previous ECG:  Compared to current    Similarity:  No change  Rate:     ECG rate:  110  Rhythm:     Rhythm: sinus rhythm    Ectopy:     Ectopy: none    QRS:     QRS axis:  Normal      ED Medication and Procedure Management   Prior to Admission Medications   Prescriptions Last Dose Informant Patient Reported? Taking?   Dextromethorphan Polistirex (ROBITUSSIN 12 HOUR COUGH PO)   Yes No   Sig: Take by mouth   aspirin 325 mg tablet   Yes No   Sig: Take 325 mg by mouth daily   buPROPion (WELLBUTRIN XL) 150 mg 24 hr tablet   Yes No   buPROPion (WELLBUTRIN XL) 300 mg 24 hr tablet   Yes No   digoxin (LANOXIN) 0.25 mg tablet   Yes No   Sig: Take 250 mcg by mouth every morning    Patient not taking: Reported on 12/2/2024   digoxin (LANOXIN) 0.25 mg tablet   Yes No   Sig: Take 125 mcg by mouth every evening    Patient not taking: Reported on 12/2/2024   fluticasone (Flovent HFA) 110 MCG/ACT inhaler   No No   Sig: Inhale 2 puffs 2 (two) times a day Rinse mouth after use.   hydrochlorothiazide (HYDRODIURIL) 12.5  "mg tablet   Yes No   Sig: Take 12.5 mg by mouth daily   levalbuterol (Xopenex HFA) 45 mcg/act inhaler   No No   Sig: Inhale 1-2 puffs every 4 (four) hours   levothyroxine 25 mcg tablet   Yes No   methylPREDNISolone 4 MG tablet therapy pack   No No   Sig: Use as directed on package   metoprolol succinate (TOPROL-XL) 25 mg 24 hr tablet   Yes No   metoprolol tartrate (LOPRESSOR) 25 mg tablet   Yes No   Sig: Take 25 mg by mouth as needed \"Prn for cardiac episodes\"   omeprazole (PriLOSEC) 20 mg delayed release capsule   Yes No   Sig: Take by mouth   ondansetron (ZOFRAN) 4 mg tablet   Yes No   Sig: every 6 (six) hours as needed   potassium chloride (MICRO-K) 10 MEQ CR capsule   No No   Sig: Take 2 capsules (20 mEq total) by mouth daily   spironolactone (ALDACTONE) 25 mg tablet   Yes No   Sig: Take 25 mg by mouth daily      Facility-Administered Medications: None     Patient's Medications   Discharge Prescriptions    No medications on file     No discharge procedures on file.  ED SEPSIS DOCUMENTATION            Albin Mann MD  12/16/24 2807    "

## 2024-12-18 LAB — BACTERIA UR CULT: NORMAL

## 2024-12-19 DIAGNOSIS — J45.40 MODERATE PERSISTENT ASTHMA WITHOUT COMPLICATION: ICD-10-CM

## 2024-12-19 RX ORDER — LEVALBUTEROL TARTRATE 45 UG/1
1-2 AEROSOL, METERED ORAL EVERY 4 HOURS
Qty: 15 G | Refills: 5 | Status: SHIPPED | OUTPATIENT
Start: 2024-12-19

## 2024-12-22 ENCOUNTER — HOSPITAL ENCOUNTER (EMERGENCY)
Facility: HOSPITAL | Age: 29
Discharge: HOME | End: 2024-12-22
Attending: EMERGENCY MEDICINE
Payer: COMMERCIAL

## 2024-12-22 VITALS
SYSTOLIC BLOOD PRESSURE: 127 MMHG | TEMPERATURE: 97.7 F | OXYGEN SATURATION: 94 % | HEART RATE: 117 BPM | RESPIRATION RATE: 18 BRPM | WEIGHT: 232.37 LBS | BODY MASS INDEX: 39.89 KG/M2 | DIASTOLIC BLOOD PRESSURE: 58 MMHG

## 2024-12-22 DIAGNOSIS — K52.9 GASTROENTERITIS: Primary | ICD-10-CM

## 2024-12-22 DIAGNOSIS — S29.011A MUSCLE STRAIN OF ANTERIOR CHEST WALL: ICD-10-CM

## 2024-12-22 LAB
ALBUMIN SERPL BCG-MCNC: 4.8 G/DL (ref 3.5–5)
ALP SERPL-CCNC: 95 U/L (ref 34–104)
ALT SERPL W P-5'-P-CCNC: 39 U/L (ref 7–52)
ANION GAP SERPL CALCULATED.3IONS-SCNC: 9 MMOL/L (ref 4–13)
AST SERPL W P-5'-P-CCNC: 36 U/L (ref 13–39)
BACTERIA UR QL AUTO: ABNORMAL /HPF
BASOPHILS # BLD MANUAL: 0 THOUSAND/UL (ref 0–0.1)
BASOPHILS NFR MAR MANUAL: 0 % (ref 0–1)
BILIRUB SERPL-MCNC: 0.85 MG/DL (ref 0.2–1)
BILIRUB UR QL STRIP: ABNORMAL
BUN SERPL-MCNC: 16 MG/DL (ref 5–25)
CALCIUM SERPL-MCNC: 9.5 MG/DL (ref 8.4–10.2)
CHLORIDE SERPL-SCNC: 104 MMOL/L (ref 96–108)
CLARITY UR: ABNORMAL
CO2 SERPL-SCNC: 24 MMOL/L (ref 21–32)
COLOR UR: ABNORMAL
CREAT SERPL-MCNC: 0.78 MG/DL (ref 0.6–1.3)
EOSINOPHIL # BLD MANUAL: 0 THOUSAND/UL (ref 0–0.4)
EOSINOPHIL NFR BLD MANUAL: 0 % (ref 0–6)
ERYTHROCYTE [DISTWIDTH] IN BLOOD BY AUTOMATED COUNT: 12.2 % (ref 11.6–15.1)
FLUAV AG UPPER RESP QL IA.RAPID: NEGATIVE
FLUBV AG UPPER RESP QL IA.RAPID: NEGATIVE
GFR SERPL CREATININE-BSD FRML MDRD: 103 ML/MIN/1.73SQ M
GLUCOSE SERPL-MCNC: 170 MG/DL (ref 65–140)
GLUCOSE UR STRIP-MCNC: NEGATIVE MG/DL
HCT VFR BLD AUTO: 50.7 % (ref 34.8–46.1)
HGB BLD-MCNC: 16.5 G/DL (ref 11.5–15.4)
HGB UR QL STRIP.AUTO: ABNORMAL
KETONES UR STRIP-MCNC: NEGATIVE MG/DL
LACTATE SERPL-SCNC: 1.7 MMOL/L (ref 0.5–2)
LACTATE SERPL-SCNC: 2.2 MMOL/L (ref 0.5–2)
LEUKOCYTE ESTERASE UR QL STRIP: NEGATIVE
LG PLATELETS BLD QL SMEAR: PRESENT
LYMPHOCYTES # BLD AUTO: 0.49 THOUSAND/UL (ref 0.6–4.47)
LYMPHOCYTES # BLD AUTO: 4 % (ref 14–44)
MCH RBC QN AUTO: 28.4 PG (ref 26.8–34.3)
MCHC RBC AUTO-ENTMCNC: 32.5 G/DL (ref 31.4–37.4)
MCV RBC AUTO: 87 FL (ref 82–98)
METAMYELOCYTE ABSOLUTE CT: 0.12 THOUSAND/UL (ref 0–0.1)
METAMYELOCYTES NFR BLD MANUAL: 1 % (ref 0–1)
MONOCYTES # BLD AUTO: 0.73 THOUSAND/UL (ref 0–1.22)
MONOCYTES NFR BLD: 6 % (ref 4–12)
NEUTROPHILS # BLD MANUAL: 10.89 THOUSAND/UL (ref 1.85–7.62)
NEUTS BAND NFR BLD MANUAL: 18 % (ref 0–8)
NEUTS SEG NFR BLD AUTO: 71 % (ref 43–75)
NITRITE UR QL STRIP: NEGATIVE
NON-SQ EPI CELLS URNS QL MICRO: ABNORMAL /HPF
OVALOCYTES BLD QL SMEAR: PRESENT
PH UR STRIP.AUTO: 5 [PH]
PLATELET # BLD AUTO: 215 THOUSANDS/UL (ref 149–390)
PLATELET BLD QL SMEAR: ADEQUATE
PMV BLD AUTO: 9 FL (ref 8.9–12.7)
POTASSIUM SERPL-SCNC: 4.1 MMOL/L (ref 3.5–5.3)
PROT SERPL-MCNC: 8 G/DL (ref 6.4–8.4)
PROT UR STRIP-MCNC: ABNORMAL MG/DL
RBC # BLD AUTO: 5.8 MILLION/UL (ref 3.81–5.12)
RBC #/AREA URNS AUTO: ABNORMAL /HPF
RBC MORPH BLD: PRESENT
SARS-COV+SARS-COV-2 AG RESP QL IA.RAPID: NEGATIVE
SODIUM SERPL-SCNC: 137 MMOL/L (ref 135–147)
SP GR UR STRIP.AUTO: >=1.03 (ref 1–1.03)
UROBILINOGEN UR QL STRIP.AUTO: 0.2 E.U./DL
WBC # BLD AUTO: 12.24 THOUSAND/UL (ref 4.31–10.16)
WBC #/AREA URNS AUTO: ABNORMAL /HPF

## 2024-12-22 PROCEDURE — 83605 ASSAY OF LACTIC ACID: CPT | Performed by: EMERGENCY MEDICINE

## 2024-12-22 PROCEDURE — 96374 THER/PROPH/DIAG INJ IV PUSH: CPT

## 2024-12-22 PROCEDURE — 99284 EMERGENCY DEPT VISIT MOD MDM: CPT

## 2024-12-22 PROCEDURE — 87804 INFLUENZA ASSAY W/OPTIC: CPT | Performed by: EMERGENCY MEDICINE

## 2024-12-22 PROCEDURE — 80053 COMPREHEN METABOLIC PANEL: CPT | Performed by: EMERGENCY MEDICINE

## 2024-12-22 PROCEDURE — 85007 BL SMEAR W/DIFF WBC COUNT: CPT | Performed by: EMERGENCY MEDICINE

## 2024-12-22 PROCEDURE — 96361 HYDRATE IV INFUSION ADD-ON: CPT

## 2024-12-22 PROCEDURE — 96375 TX/PRO/DX INJ NEW DRUG ADDON: CPT

## 2024-12-22 PROCEDURE — 85027 COMPLETE CBC AUTOMATED: CPT | Performed by: EMERGENCY MEDICINE

## 2024-12-22 PROCEDURE — 81001 URINALYSIS AUTO W/SCOPE: CPT | Performed by: EMERGENCY MEDICINE

## 2024-12-22 PROCEDURE — 99285 EMERGENCY DEPT VISIT HI MDM: CPT | Performed by: EMERGENCY MEDICINE

## 2024-12-22 PROCEDURE — 36415 COLL VENOUS BLD VENIPUNCTURE: CPT | Performed by: EMERGENCY MEDICINE

## 2024-12-22 PROCEDURE — 87811 SARS-COV-2 COVID19 W/OPTIC: CPT | Performed by: EMERGENCY MEDICINE

## 2024-12-22 RX ORDER — HYDROMORPHONE HCL IN WATER/PF 6 MG/30 ML
0.2 PATIENT CONTROLLED ANALGESIA SYRINGE INTRAVENOUS ONCE
Status: COMPLETED | OUTPATIENT
Start: 2024-12-22 | End: 2024-12-22

## 2024-12-22 RX ORDER — HYDROMORPHONE HCL/PF 1 MG/ML
0.5 SYRINGE (ML) INJECTION ONCE
Refills: 0 | Status: COMPLETED | OUTPATIENT
Start: 2024-12-22 | End: 2024-12-22

## 2024-12-22 RX ORDER — KETOROLAC TROMETHAMINE 30 MG/ML
15 INJECTION, SOLUTION INTRAMUSCULAR; INTRAVENOUS ONCE
Status: COMPLETED | OUTPATIENT
Start: 2024-12-22 | End: 2024-12-22

## 2024-12-22 RX ORDER — ONDANSETRON 4 MG/1
4 TABLET, FILM COATED ORAL EVERY 6 HOURS
Qty: 12 TABLET | Refills: 0 | Status: SHIPPED | OUTPATIENT
Start: 2024-12-22

## 2024-12-22 RX ORDER — HYDROCODONE BITARTRATE AND ACETAMINOPHEN 5; 325 MG/1; MG/1
1 TABLET ORAL EVERY 6 HOURS PRN
Qty: 10 TABLET | Refills: 0 | Status: SHIPPED | OUTPATIENT
Start: 2024-12-22

## 2024-12-22 RX ORDER — ONDANSETRON 2 MG/ML
4 INJECTION INTRAMUSCULAR; INTRAVENOUS ONCE
Status: COMPLETED | OUTPATIENT
Start: 2024-12-22 | End: 2024-12-22

## 2024-12-22 RX ADMIN — SODIUM CHLORIDE 1000 ML: 0.9 INJECTION, SOLUTION INTRAVENOUS at 15:38

## 2024-12-22 RX ADMIN — ONDANSETRON 4 MG: 2 INJECTION INTRAMUSCULAR; INTRAVENOUS at 13:43

## 2024-12-22 RX ADMIN — HYDROMORPHONE HYDROCHLORIDE 0.2 MG: 0.2 INJECTION, SOLUTION INTRAMUSCULAR; INTRAVENOUS; SUBCUTANEOUS at 13:44

## 2024-12-22 RX ADMIN — SODIUM CHLORIDE 1000 ML: 0.9 INJECTION, SOLUTION INTRAVENOUS at 13:37

## 2024-12-22 RX ADMIN — KETOROLAC TROMETHAMINE 15 MG: 30 INJECTION, SOLUTION INTRAMUSCULAR; INTRAVENOUS at 13:43

## 2024-12-22 RX ADMIN — HYDROMORPHONE HYDROCHLORIDE 0.5 MG: 1 INJECTION, SOLUTION INTRAMUSCULAR; INTRAVENOUS; SUBCUTANEOUS at 15:37

## 2024-12-22 NOTE — ED PROVIDER NOTES
Time reflects when diagnosis was documented in both MDM as applicable and the Disposition within this note       Time User Action Codes Description Comment    12/22/2024  4:23 PM Linda Barcenas [K52.9] Gastroenteritis     12/22/2024  4:24 PM Linda Barcenas [S29.011A] Muscle strain of anterior chest wall           ED Disposition       ED Disposition   Discharge    Condition   Stable    Date/Time   Sun Dec 22, 2024  4:23 PM    Comment   Kenneth Abreu discharge to home/self care.                   Assessment & Plan       Medical Decision Making  Abdominal exam without peritoneal signs.  No evidence of acute abdomen at this time.  Well-appearing.  Given work-up, low suspicion for acute hepatobiliary disease (including acute cholecystitis or cholangitis), acute pancreatitis (negative lipase), PUD (including gastric perforation), acute infectious processes (pneumonia, hepatitis, pyelonephritis), acute appendicitis, vascular catastrophe, bowel obstruction, viscus perforation, ovarian/testicular torsion, or diverticulitis.  Presentation not consistent with other acute emergent causes of abdominal pain at this time.    Problems Addressed:  Gastroenteritis: acute illness or injury  Muscle strain of anterior chest wall: acute illness or injury    Amount and/or Complexity of Data Reviewed  Labs: ordered. Decision-making details documented in ED Course.    Risk  OTC drugs.  Prescription drug management.        ED Course as of 12/22/24 1729   Sun Dec 22, 2024   1728 Lactic acid 2 Hours   1729 UA w Reflex to Microscopic w Reflex to Culture(!)   1729 Urine Microscopic(!)   1729 FLU/COVID Rapid Antigen (30 min. TAT) - Preferred screening test in ED   1729 Manual Differential(PHLEBS Do Not Order)(!)   1729 Lactic acid, plasma (w/reflex if result > 2.0)(!)   1729 Comprehensive metabolic panel(!)       Medications   sodium chloride 0.9 % bolus 1,000 mL (0 mL Intravenous Stopped 12/22/24 1512)   ondansetron (ZOFRAN) injection 4  mg (4 mg Intravenous Given 12/22/24 1343)   ketorolac (TORADOL) injection 15 mg (15 mg Intravenous Given 12/22/24 1343)   HYDROmorphone HCl (DILAUDID) injection 0.2 mg (0.2 mg Intravenous Given 12/22/24 1344)   sodium chloride 0.9 % bolus 1,000 mL (0 mL Intravenous Stopped 12/22/24 1625)   HYDROmorphone (DILAUDID) injection 0.5 mg (0.5 mg Intravenous Given 12/22/24 1537)       ED Risk Strat Scores                          SBIRT 22yo+      Flowsheet Row Most Recent Value   Initial Alcohol Screen: US AUDIT-C     1. How often do you have a drink containing alcohol? 2 Filed at: 12/22/2024 1331   2. How many drinks containing alcohol do you have on a typical day you are drinking?  0 Filed at: 12/22/2024 1331   3a. Male UNDER 65: How often do you have five or more drinks on one occasion? 0 Filed at: 12/22/2024 1331   3b. FEMALE Any Age, or MALE 65+: How often do you have 4 or more drinks on one occassion? 0 Filed at: 12/22/2024 1331   Audit-C Score 2 Filed at: 12/22/2024 1331   JOHN: How many times in the past year have you...    Used an illegal drug or used a prescription medication for non-medical reasons? Never Filed at: 12/22/2024 1331                            History of Present Illness       Chief Complaint   Patient presents with    Abdominal Pain     Patient reports being seen recently for same. Still having RUQ pain. Nausea, diarrhea. Patient took zofran and pepto prior to arrival       Past Medical History:   Diagnosis Date    ASD (atrial septal defect)     Asthma     Cirrhosis of liver (HCC)     COVID-19 11/2023    Diabetes mellitus (HCC)     History of transfusion     IBS (irritable bowel syndrome)     Pulmonary atresia     SVT (supraventricular tachycardia) (HCC)       Past Surgical History:   Procedure Laterality Date    CARDIAC SURGERY      NISSEN FUNDOPLICATION      as an infant    UT EXCISION TUMOR SOFT TISSUE BACK/FLANK SUBQ <3CM Right 5/10/2024    Procedure: BACK LIPOMA EXCISION (2 X 1CM);  Surgeon:  Vida Mathis DO;  Location: [facility] OR;  Service: General      Family History   Problem Relation Age of Onset    Diabetes Mother     No Known Problems Father     No Known Problems Maternal Grandmother     No Known Problems Paternal Grandmother     Lymphoma Maternal Grandfather         non-Hodgkin's lymphoma    No Known Problems Paternal Grandfather     No Known Problems Maternal Aunt     No Known Problems Maternal Aunt     No Known Problems Paternal Aunt     Pancreatic cancer Maternal Uncle     BRCA2 Positive Neg Hx     BRCA2 Negative Neg Hx     BRCA1 Positive Neg Hx     BRCA1 Negative Neg Hx     BRCA 1/2 Neg Hx     Ovarian cancer Neg Hx     Endometrial cancer Neg Hx     Colon cancer Neg Hx     Breast cancer additional onset Neg Hx     Breast cancer Neg Hx       Social History     Tobacco Use    Smoking status: Never    Smokeless tobacco: Never   Vaping Use    Vaping status: Never Used   Substance Use Topics    Alcohol use: Yes     Alcohol/week: 2.0 standard drinks of alcohol     Types: 2 Standard drinks or equivalent per week     Comment: socially per the pt    Drug use: Not Currently      E-Cigarette/Vaping    E-Cigarette Use Never User       E-Cigarette/Vaping Substances      I have reviewed and agree with the history as documented.     Patient is a 29-year-old female presenting to the emergency department complaining of epigastric and right upper quadrant abdominal discomfort with nausea, dry heaves and diarrhea, patient seen recently for similar symptoms, had relatively unremarkable workup, symptoms returned today now with dry heaves and diarrhea, patient did not eat anything this morning, no fevers but patient does report chills, no urinary symptoms, no sick contacts, no questionable food intake recently        Review of Systems   Constitutional: Negative.    HENT: Negative.     Eyes: Negative.    Respiratory: Negative.     Cardiovascular: Negative.    Gastrointestinal:  Positive for abdominal  pain, diarrhea and nausea.   Endocrine: Negative.    Genitourinary: Negative.    Musculoskeletal: Negative.    Skin: Negative.    Allergic/Immunologic: Negative.    Neurological: Negative.    Hematological: Negative.    Psychiatric/Behavioral: Negative.             Objective       ED Triage Vitals   Temperature Pulse Blood Pressure Respirations SpO2 Patient Position - Orthostatic VS   12/22/24 1328 12/22/24 1328 12/22/24 1328 12/22/24 1328 12/22/24 1328 12/22/24 1328   97.7 °F (36.5 °C) (!) 115 166/83 20 98 % Lying      Temp Source Heart Rate Source BP Location FiO2 (%) Pain Score    12/22/24 1328 12/22/24 1328 12/22/24 1500 -- 12/22/24 1328    Temporal Monitor Left arm  10 - Worst Possible Pain      Vitals      Date and Time Temp Pulse SpO2 Resp BP Pain Score FACES Pain Rating User   12/22/24 1600 -- 117 94 % 18 127/58 -- --    12/22/24 1537 -- -- -- -- -- 8 --    12/22/24 1500 -- 110 96 % 18 123/58 -- -- KW   12/22/24 1343 -- -- -- -- -- 10 - Worst Possible Pain --    12/22/24 1328 97.7 °F (36.5 °C) 115 98 % 20 166/83 10 - Worst Possible Pain -- AS            Physical Exam  Constitutional:       Appearance: She is well-developed.   HENT:      Head: Normocephalic and atraumatic.   Eyes:      Conjunctiva/sclera: Conjunctivae normal.      Pupils: Pupils are equal, round, and reactive to light.   Cardiovascular:      Rate and Rhythm: Normal rate.   Pulmonary:      Effort: Pulmonary effort is normal.   Abdominal:      Palpations: Abdomen is soft.      Tenderness: There is abdominal tenderness in the right upper quadrant and epigastric area.   Musculoskeletal:         General: Normal range of motion.      Cervical back: Normal range of motion and neck supple.   Skin:     General: Skin is warm and dry.   Neurological:      Mental Status: She is alert and oriented to person, place, and time.         Results Reviewed       Procedure Component Value Units Date/Time    Lactic acid 2 Hours [[id number]]  (Normal)  Collected: 12/22/24 1537    Lab Status: Final result Specimen: Blood from Arm, Right Updated: 12/22/24 1608     LACTIC ACID 1.7 mmol/L     Narrative:      Result may be elevated if tourniquet was used during collection.    Urine Microscopic [[id number]]  (Abnormal) Collected: 12/22/24 1518    Lab Status: Final result Specimen: Urine, Clean Catch Updated: 12/22/24 1534     RBC, UA Innumerable /hpf      WBC, UA 4-10 /hpf      Epithelial Cells Occasional /hpf      Bacteria, UA Occasional /hpf     UA w Reflex to Microscopic w Reflex to Culture [[id number]]  (Abnormal) Collected: 12/22/24 1518    Lab Status: Final result Specimen: Urine, Clean Catch Updated: 12/22/24 1527     Color, UA Red     Clarity, UA Cloudy     Specific Gravity, UA >=1.030     pH, UA 5.0     Leukocytes, UA Negative     Nitrite, UA Negative     Protein, UA 30 (1+) mg/dl      Glucose, UA Negative mg/dl      Ketones, UA Negative mg/dl      Urobilinogen, UA 0.2 E.U./dl      Bilirubin, UA Small     Occult Blood, UA Large    RBC Morphology Reflex Test [[id number]] Collected: 12/22/24 1337    Lab Status: Final result Specimen: Blood from Arm, Right Updated: 12/22/24 1501    Manual Differential(PHLEBS Do Not Order) [[id number]]  (Abnormal) Collected: 12/22/24 1337    Lab Status: Final result Specimen: Blood from Arm, Right Updated: 12/22/24 1421     Segmented % 71 %      Bands % 18 %      Lymphocytes % 4 %      Monocytes % 6 %      Eosinophils % 0 %      Basophils % 0 %      Metamyelocytes % 1 %      Absolute Neutrophils 10.89 Thousand/uL      Absolute Lymphocytes 0.49 Thousand/uL      Absolute Monocytes 0.73 Thousand/uL      Absolute Eosinophils 0.00 Thousand/uL      Absolute Basophils 0.00 Thousand/uL      Absolute Metamyelocytes 0.12 Thousand/uL      Total Counted --     RBC Morphology Present     Platelet Estimate Adequate     Large Platelet Present     Ovalocytes Present    CBC and differential [[id number]]  (Abnormal) Collected: 12/22/24 1337    Lab  Status: Final result Specimen: Blood from Arm, Right Updated: 12/22/24 1421     WBC 12.24 Thousand/uL      RBC 5.80 Million/uL      Hemoglobin 16.5 g/dL      Hematocrit 50.7 %      MCV 87 fL      MCH 28.4 pg      MCHC 32.5 g/dL      RDW 12.2 %      MPV 9.0 fL      Platelets 215 Thousands/uL     Narrative:      This is an appended report.  These results have been appended to a previously verified report.    FLU/COVID Rapid Antigen (30 min. TAT) - Preferred screening test in ED [[id number]]  (Normal) Collected: 12/22/24 1346    Lab Status: Final result Specimen: Nares from Nose Updated: 12/22/24 1412     SARS COV Rapid Antigen Negative     Influenza A Rapid Antigen Negative     Influenza B Rapid Antigen Negative    Narrative:      This test has been performed using the [email or web address] Amber 2 FLU+SARS Antigen test under the Emergency Use Authorization (EUA). This test has been validated by the  and verified by the performing laboratory. The Amber uses lateral flow immunofluorescent sandwich assay to detect SARS-COV, Influenza A and Influenza B Antigen.     The [employer] Amber 2 SARS Antigen test does not differentiate between SARS-CoV and SARS-CoV-2.     Negative results are presumptive and may be confirmed with a molecular assay, if necessary, for patient management. Negative results do not rule out SARS-CoV-2 or influenza infection and should not be used as the sole basis for treatment or patient management decisions. A negative test result may occur if the level of antigen in a sample is below the limit of detection of this test.     Positive results are indicative of the presence of viral antigens, but do not rule out bacterial infection or co-infection with other viruses.     All test results should be used as an adjunct to clinical observations and other information available to the provider.    FOR PEDIATRIC PATIENTS - copy/paste COVID Guidelines URL to browser:  [email or web address]    Lactic acid, plasma (w/reflex if result > 2.0) [[id number]]  (Abnormal) Collected: 12/22/24 1337    Lab Status: Final result Specimen: Blood from Arm, Right Updated: 12/22/24 1408     LACTIC ACID 2.2 mmol/L     Narrative:      Result may be elevated if tourniquet was used during collection.    Comprehensive metabolic panel [[id number]]  (Abnormal) Collected: 12/22/24 1337    Lab Status: Final result Specimen: Blood from Arm, Right Updated: 12/22/24 1403     Sodium 137 mmol/L      Potassium 4.1 mmol/L      Chloride 104 mmol/L      CO2 24 mmol/L      ANION GAP 9 mmol/L      BUN 16 mg/dL      Creatinine 0.78 mg/dL      Glucose 170 mg/dL      Calcium 9.5 mg/dL      AST 36 U/L      ALT 39 U/L      Alkaline Phosphatase 95 U/L      Total Protein 8.0 g/dL      Albumin 4.8 g/dL      Total Bilirubin 0.85 mg/dL      eGFR 103 ml/min/1.73sq m     Narrative:      National Kidney Disease Foundation guidelines for Chronic Kidney Disease (CKD):     Stage 1 with normal or high GFR (GFR > 90 mL/min/1.73 square meters)    Stage 2 Mild CKD (GFR = 60-89 mL/min/1.73 square meters)    Stage 3A Moderate CKD (GFR = 45-59 mL/min/1.73 square meters)    Stage 3B Moderate CKD (GFR = 30-44 mL/min/1.73 square meters)    Stage 4 Severe CKD (GFR = 15-29 mL/min/1.73 square meters)    Stage 5 End Stage CKD (GFR <15 mL/min/1.73 square meters)  Note: GFR calculation is accurate only with a steady state creatinine            No orders to display       Procedures    ED Medication and Procedure Management   Prior to Admission Medications   Prescriptions Last Dose Informant Patient Reported? Taking?   Dextromethorphan Polistirex (ROBITUSSIN 12 HOUR COUGH PO)   Yes No   Sig: Take by mouth   aspirin 325 mg tablet   Yes No   Sig: Take 325 mg by mouth daily   buPROPion (WELLBUTRIN XL) 150 mg 24 hr tablet   Yes No   buPROPion (WELLBUTRIN XL) 300 mg 24 hr tablet   Yes No   digoxin  "(LANOXIN) 0.25 mg tablet   Yes No   Sig: Take 250 mcg by mouth every morning    Patient not taking: Reported on 12/2/2024   digoxin (LANOXIN) 0.25 mg tablet   Yes No   Sig: Take 125 mcg by mouth every evening    Patient not taking: Reported on 12/2/2024   fluticasone (Flovent HFA) 110 MCG/ACT inhaler   No No   Sig: Inhale 2 puffs 2 (two) times a day Rinse mouth after use.   hydrochlorothiazide (HYDRODIURIL) 12.5 mg tablet   Yes No   Sig: Take 12.5 mg by mouth daily   levalbuterol (XOPENEX HFA) 45 mcg/act inhaler   No No   Sig: INHALE 1-2 PUFFS EVERY 4 (FOUR) HOURS   levothyroxine 25 mcg tablet   Yes No   methylPREDNISolone 4 MG tablet therapy pack   No No   Sig: Use as directed on package   metoprolol succinate (TOPROL-XL) 25 mg 24 hr tablet   Yes No   metoprolol tartrate (LOPRESSOR) 25 mg tablet   Yes No   Sig: Take 25 mg by mouth as needed \"Prn for cardiac episodes\"   omeprazole (PriLOSEC) 20 mg delayed release capsule   Yes No   Sig: Take by mouth   ondansetron (ZOFRAN) 4 mg tablet   Yes No   Sig: every 6 (six) hours as needed   pantoprazole (PROTONIX) 20 mg tablet   No No   Sig: Take 1 tablet (20 mg total) by mouth daily   potassium chloride (MICRO-K) 10 MEQ CR capsule   No No   Sig: Take 2 capsules (20 mEq total) by mouth daily   spironolactone (ALDACTONE) 25 mg tablet   Yes No   Sig: Take 25 mg by mouth daily      Facility-Administered Medications: None     Discharge Medication List as of 12/22/2024  4:29 PM        START taking these medications    Details   HYDROcodone-acetaminophen (Norco) 5-325 mg per tablet Take 1 tablet by mouth every 6 (six) hours as needed for pain for up to 10 doses Max Daily Amount: 4 tablets, Starting Sun 12/22/2024, Normal      !! ondansetron (ZOFRAN) 4 mg tablet Take 1 tablet (4 mg total) by mouth every 6 (six) hours, Starting Sun 12/22/2024, Normal       !! - Potential duplicate medications found. Please discuss with provider.        CONTINUE these medications which have NOT " "CHANGED    Details   aspirin 325 mg tablet Take 325 mg by mouth daily, Historical Med      !! buPROPion (WELLBUTRIN XL) 150 mg 24 hr tablet Historical Med      !! buPROPion (WELLBUTRIN XL) 300 mg 24 hr tablet Historical Med      Dextromethorphan Polistirex (ROBITUSSIN 12 HOUR COUGH PO) Take by mouth, Historical Med      !! digoxin (LANOXIN) 0.25 mg tablet Take 250 mcg by mouth every morning , Starting Wed 5/13/2020, Historical Med      !! digoxin (LANOXIN) 0.25 mg tablet Take 125 mcg by mouth every evening , Historical Med      fluticasone (Flovent HFA) 110 MCG/ACT inhaler Inhale 2 puffs 2 (two) times a day Rinse mouth after use., Starting Wed 3/27/2024, Normal      hydrochlorothiazide (HYDRODIURIL) 12.5 mg tablet Take 12.5 mg by mouth daily, Historical Med      levalbuterol (XOPENEX HFA) 45 mcg/act inhaler INHALE 1-2 PUFFS EVERY 4 (FOUR) HOURS, Starting Thu 12/19/2024, Normal      levothyroxine 25 mcg tablet Historical Med      methylPREDNISolone 4 MG tablet therapy pack Use as directed on package, Normal      metoprolol succinate (TOPROL-XL) 25 mg 24 hr tablet Historical Med      metoprolol tartrate (LOPRESSOR) 25 mg tablet Take 25 mg by mouth as needed \"Prn for cardiac episodes\", Historical Med      omeprazole (PriLOSEC) 20 mg delayed release capsule Take by mouth, Starting Wed 9/4/2024, Historical Med      !! ondansetron (ZOFRAN) 4 mg tablet every 6 (six) hours as needed, Starting Tue 1/9/2024, Historical Med      pantoprazole (PROTONIX) 20 mg tablet Take 1 tablet (20 mg total) by mouth daily, Starting Mon 12/16/2024, Normal      potassium chloride (MICRO-K) 10 MEQ CR capsule Take 2 capsules (20 mEq total) by mouth daily, Starting Thu 7/29/2021, Until Mon 12/2/2024, Normal      spironolactone (ALDACTONE) 25 mg tablet Take 25 mg by mouth daily, Starting Mon 10/18/2021, Historical Med       !! - Potential duplicate medications found. Please discuss with provider.        No discharge procedures on file.  ED SEPSIS " DOCUMENTATION   Time reflects when diagnosis was documented in both MDM as applicable and the Disposition within this note       Time User Action Codes Description Comment    12/22/2024  4:23 PM Linda Barcenas [K52.9] Gastroenteritis     12/22/2024  4:24 PM Linda Barcenas [S29.011A] Muscle strain of anterior chest wall                  Linda Barcenas,   12/22/24 0038

## 2025-01-16 ENCOUNTER — APPOINTMENT (OUTPATIENT)
Dept: LAB | Facility: HOSPITAL | Age: 30
End: 2025-01-16
Payer: COMMERCIAL

## 2025-01-16 DIAGNOSIS — R93.2 NONVISUALIZATION OF GALLBLADDER: ICD-10-CM

## 2025-01-16 LAB
BASOPHILS # BLD AUTO: 0.02 THOUSANDS/ΜL (ref 0–0.1)
BASOPHILS NFR BLD AUTO: 0 % (ref 0–1)
EOSINOPHIL # BLD AUTO: 0.21 THOUSAND/ΜL (ref 0–0.61)
EOSINOPHIL NFR BLD AUTO: 4 % (ref 0–6)
ERYTHROCYTE [DISTWIDTH] IN BLOOD BY AUTOMATED COUNT: 12 % (ref 11.6–15.1)
HCT VFR BLD AUTO: 42.6 % (ref 34.8–46.1)
HGB BLD-MCNC: 14.2 G/DL (ref 11.5–15.4)
IMM GRANULOCYTES # BLD AUTO: 0.02 THOUSAND/UL (ref 0–0.2)
IMM GRANULOCYTES NFR BLD AUTO: 0 % (ref 0–2)
LYMPHOCYTES # BLD AUTO: 1.35 THOUSANDS/ΜL (ref 0.6–4.47)
LYMPHOCYTES NFR BLD AUTO: 22 % (ref 14–44)
MCH RBC QN AUTO: 28.1 PG (ref 26.8–34.3)
MCHC RBC AUTO-ENTMCNC: 33.3 G/DL (ref 31.4–37.4)
MCV RBC AUTO: 84 FL (ref 82–98)
MONOCYTES # BLD AUTO: 0.33 THOUSAND/ΜL (ref 0.17–1.22)
MONOCYTES NFR BLD AUTO: 5 % (ref 4–12)
NEUTROPHILS # BLD AUTO: 4.13 THOUSANDS/ΜL (ref 1.85–7.62)
NEUTS SEG NFR BLD AUTO: 69 % (ref 43–75)
NRBC BLD AUTO-RTO: 0 /100 WBCS
PLATELET # BLD AUTO: 196 THOUSANDS/UL (ref 149–390)
PMV BLD AUTO: 9.2 FL (ref 8.9–12.7)
RBC # BLD AUTO: 5.06 MILLION/UL (ref 3.81–5.12)
WBC # BLD AUTO: 6.06 THOUSAND/UL (ref 4.31–10.16)

## 2025-01-16 PROCEDURE — 36415 COLL VENOUS BLD VENIPUNCTURE: CPT

## 2025-01-16 PROCEDURE — 85025 COMPLETE CBC W/AUTO DIFF WBC: CPT

## 2025-02-10 RX ORDER — ACETAMINOPHEN AND CODEINE PHOSPHATE 300; 30 MG/1; MG/1
TABLET ORAL
COMMUNITY
Start: 2024-12-17

## 2025-02-10 RX ORDER — LORAZEPAM 0.5 MG/1
TABLET ORAL
COMMUNITY
Start: 2024-12-12

## 2025-02-12 ENCOUNTER — OFFICE VISIT (OUTPATIENT)
Dept: PULMONOLOGY | Facility: CLINIC | Age: 30
End: 2025-02-12
Payer: COMMERCIAL

## 2025-02-12 VITALS
WEIGHT: 226 LBS | HEART RATE: 84 BPM | TEMPERATURE: 97.8 F | HEIGHT: 64 IN | SYSTOLIC BLOOD PRESSURE: 128 MMHG | OXYGEN SATURATION: 99 % | DIASTOLIC BLOOD PRESSURE: 74 MMHG | BODY MASS INDEX: 38.58 KG/M2

## 2025-02-12 DIAGNOSIS — J45.40 MODERATE PERSISTENT ASTHMA WITHOUT COMPLICATION: Primary | ICD-10-CM

## 2025-02-12 DIAGNOSIS — G47.33 OSA (OBSTRUCTIVE SLEEP APNEA): ICD-10-CM

## 2025-02-12 PROCEDURE — 99213 OFFICE O/P EST LOW 20 MIN: CPT | Performed by: INTERNAL MEDICINE

## 2025-02-12 NOTE — PROGRESS NOTES
"Pulmonary Follow Up Note   Kenneth Abreu 29 y.o. female MRN: [id number]  2/12/2025    Assessment:  Mild intermittent asthma  Stable on PRN Xopenex since 4 months, using once a week, off Flovent since 10/2024  Likely nonallergic type II, suspect from overweight  Negative [facility] allergy panel  +hx allergic rhinitis, seasonal allergies during the fall and asthma as a child  PFT with significant BD response at the level of FEV1, air trapping and normal DLCO  Avoided beta agonists due to hx SVT in the past    Plan:  Continue Xopenex PRN  Counseled, if noted frequent use on daily basis, will start using Flovent already has supplies  Up-to-date on immunization    Obstructive sleep apnea  Last seen by sleep medicine at [facility] in 2/2022  At that time was using the CPAP however stopped using it after lost approximately 50 pounds  Reported improvement of snoring since lost weight  However, regained a lot of weight back to be class II obesity BMI of 38 currently  Now with daytime fatigue, excessive sleepiness and unrefreshing sleep  Not interested in sleep study now, canceled the polysomnography  Will rediscuss at next visit    Return in about 6 months (around 8/12/2025).    History of Present Illness     Follow up for: Asthma     Background:  As per initial consult notes    \"29 y.o. female with a h/o tetralogy of Fallot, pulmonary atresia, s/p catheter directed TPVR, ASD, liver cirrhosis, IBS, DM2, class I obesity, CHERRI, hypothyroidism, Nissen fundoplication     Hospitalized at birth to [facility] for tetralogy of Fallot, had open heart surgery/surgical repair and placed on mechanical ventilation for prolonged time.  Reports developing restrictive lung disease after that.     Most recent procedure 5/2022 underwent successful catheter directed placement of pulmonary valve.  Follows with [facility] for advanced/congenital heart disease center.  Also on surveillance by hepatology therefore liver " "cirrhosis/fibrosis thought to be from hepatic congestion.     Referred to pulmonary for evaluation of occasional dyspnea and wheezing with exertion noted since 11/2023.     Symptoms started with feeling tired, fatigue and cough tested positive for COVID-19.  Then started to develop wheezing associated with MOE, slightly improved since then but not resolved.  Sometimes at rest.  No associated chest congestion, cough.  No orthopnea, or PND.  Known to have intermittent lower extremity edema but nothing out of baseline.  Also reported SpO2 dropped to 88% few times measured by her pulse oximetry.     Denies any change in exercise capacity, still active/independent doing work at home intermittently without limitations.  No fever, chills, night sweats.  No voice changes, dysphagia or aspiration.  No allergic rhinitis symptoms currently.     Reports remote history of mild asthma as a child, told to be exercise-induced, was on inhalers/nebulizer until age of 10 and then discontinued.  Also reports seasonal allergy/allergic rhinitis around the time of the fall including nasal congestion, cough and sinusitis like symptoms however none of the symptoms developed recently.     Seen by sleep medicine at [facility] in 2/2022, diagnosed with CHERRI.  At that time intentionally lost about 50 pounds.  Reported improvement of the snoring then discontinued the CPAP therapy.     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  Negative [place] allergy " "panel/HP panel     7/2024 visit-ordered Xopenex HFA, given a list of ICS to check with insurance which 1 is covered, in lab sleep study.  Nebulizer/supplies was PRN Xopenex    10/2024 visit-felt better with Xopenex nebs/HFA, less chest tightness/less dyspnea and wheezing frequency.  Stopped using Flovent/Held.      Interval History  Since last seen, no major changes.  Felt a lot better, cough/wheezing are very minimal.  Using Xopenex no more than once a week.  Otherwise active, independent.  No change in her environment, sometimes feel worse when exposed to outside allergens/at the farm.  But self-limiting.      Review of Systems  As per hpi, all other systems reviewed and were negative    Studies:  Imaging and other studies: I have personally reviewed pertinent films in PACS        Pulmonary function testing:   PFT 4/12/2024-ratio 74%, FEV1 1.54 L / 47%, FVC 2.08 L / 54%, TLC 93%, %, DLCO 117%.  10% increase of FEV1 following BD administration     6-minute walk test 3/27/2024-baseline SpO2 97% on room air, heart rate 103, able to walk 240 m in 6 minutes, lowest SpO2 at 94%, maximal heart rate 123, no significant oxygen desaturation     EKG, Pathology, and Other Studies: I have personally reviewed pertinent reports.           Past medical, surgical, social and family histories reviewed.      Medications/Allergies: Reviewed      Vitals: Blood pressure 128/74, pulse 84, temperature 97.8 °F (36.6 °C), temperature source Temporal, height 5' 4\" (1.626 m), weight 103 kg (226 lb), SpO2 99%, not currently breastfeeding. Body mass index is 38.79 kg/m². Oxygen Therapy  SpO2: 99 %      Physical Exam  Body mass index is 38.79 kg/m².   Gen: not in acute distress,   Neck/Eyes: supple, no adenopathy, PERRL  Ear: normal appearance, no significant hearing impairment  Nose:  normal nasal mucosa, no drainage  Chest: normal respiratory efforts, clear breath sounds bilaterally  CV: + Systolic murmurs appreciated, no " "edema  Abdomen: soft, non tender  Extremities:  No observed deformity   Skin: unremarkable  Neuro: AAO X3, no focal motor deficit        Labs:  Lab Results   Component Value Date    WBC 6.06 01/16/2025    HGB 14.2 01/16/2025    HCT 42.6 01/16/2025    MCV 84 01/16/2025     01/16/2025     Lab Results   Component Value Date    GLUCOSE 79 03/14/2015    CALCIUM 9.5 12/22/2024     03/14/2015    K 4.1 12/22/2024    CO2 24 12/22/2024     12/22/2024    BUN 16 12/22/2024    CREATININE 0.78 12/22/2024     Lab Results   Component Value Date    IGE 14.8 04/03/2024     Lab Results   Component Value Date    ALT 39 12/22/2024    AST 36 12/22/2024    ALKPHOS 95 12/22/2024    BILITOT 1.2 (H) 03/14/2015           Portions of the record may have been created with voice recognition software.  Occasional wrong word or \"sound a like\" substitutions may have occurred due to the inherent limitations of voice recognition software.  Read the chart carefully and recognize, using context, where substitutions have occurred    Dany Musa M.D.  [facility]  Answers submitted by the patient for this visit:  Pulmonology Questionnaire (Submitted on 2/7/2025)  Chief Complaint: Primary symptoms  Do you have shortness of breath that occurs with effort or exertion?: No  Do you have ear congestion?: No  Do you have heartburn?: No  Do you have fatigue?: No  Do you have nasal congestion?: No  Do you have shortness of breath when lying flat?: No  Do you have shortness of breath when you wake up?: No  Do you have sweats?: No  Have you experienced weight loss?: No  Which of the following makes your symptoms worse?: nothing  Which of the following makes your symptoms better?: nothing    "

## 2025-02-15 ENCOUNTER — APPOINTMENT (OUTPATIENT)
Dept: LAB | Facility: HOSPITAL | Age: 30
End: 2025-02-15
Payer: COMMERCIAL

## 2025-02-15 DIAGNOSIS — E11.65 INADEQUATELY CONTROLLED DIABETES MELLITUS (HCC): ICD-10-CM

## 2025-02-15 DIAGNOSIS — E03.9 MYXEDEMA HEART DISEASE: ICD-10-CM

## 2025-02-15 DIAGNOSIS — I51.9 MYXEDEMA HEART DISEASE: ICD-10-CM

## 2025-02-15 LAB
EST. AVERAGE GLUCOSE BLD GHB EST-MCNC: 128 MG/DL
HBA1C MFR BLD: 6.1 %
T4 FREE SERPL-MCNC: 0.9 NG/DL (ref 0.61–1.12)
TSH SERPL DL<=0.05 MIU/L-ACNC: 5.01 UIU/ML (ref 0.45–4.5)

## 2025-02-15 PROCEDURE — 84443 ASSAY THYROID STIM HORMONE: CPT

## 2025-02-15 PROCEDURE — 84439 ASSAY OF FREE THYROXINE: CPT

## 2025-02-15 PROCEDURE — 83036 HEMOGLOBIN GLYCOSYLATED A1C: CPT

## 2025-02-15 PROCEDURE — 36415 COLL VENOUS BLD VENIPUNCTURE: CPT

## 2025-02-20 ENCOUNTER — HOSPITAL ENCOUNTER (OUTPATIENT)
Dept: ULTRASOUND IMAGING | Facility: HOSPITAL | Age: 30
End: 2025-02-20
Payer: COMMERCIAL

## 2025-02-20 ENCOUNTER — APPOINTMENT (OUTPATIENT)
Dept: LAB | Facility: HOSPITAL | Age: 30
End: 2025-02-20
Payer: COMMERCIAL

## 2025-02-20 DIAGNOSIS — K76.1 CARDIAC CIRRHOSIS: ICD-10-CM

## 2025-02-20 LAB
AFP-TM SERPL-MCNC: 0.94 NG/ML (ref 0–9)
ALBUMIN SERPL BCG-MCNC: 4.2 G/DL (ref 3.5–5)
ALP SERPL-CCNC: 77 U/L (ref 34–104)
ALT SERPL W P-5'-P-CCNC: 25 U/L (ref 7–52)
ANION GAP SERPL CALCULATED.3IONS-SCNC: 4 MMOL/L (ref 4–13)
AST SERPL W P-5'-P-CCNC: 19 U/L (ref 13–39)
BASOPHILS # BLD AUTO: 0.02 THOUSANDS/ΜL (ref 0–0.1)
BASOPHILS NFR BLD AUTO: 0 % (ref 0–1)
BILIRUB SERPL-MCNC: 0.71 MG/DL (ref 0.2–1)
BUN SERPL-MCNC: 10 MG/DL (ref 5–25)
CALCIUM SERPL-MCNC: 9.2 MG/DL (ref 8.4–10.2)
CHLORIDE SERPL-SCNC: 104 MMOL/L (ref 96–108)
CO2 SERPL-SCNC: 31 MMOL/L (ref 21–32)
CREAT SERPL-MCNC: 0.68 MG/DL (ref 0.6–1.3)
EOSINOPHIL # BLD AUTO: 0.14 THOUSAND/ΜL (ref 0–0.61)
EOSINOPHIL NFR BLD AUTO: 2 % (ref 0–6)
ERYTHROCYTE [DISTWIDTH] IN BLOOD BY AUTOMATED COUNT: 11.9 % (ref 11.6–15.1)
GFR SERPL CREATININE-BSD FRML MDRD: 118 ML/MIN/1.73SQ M
GLUCOSE P FAST SERPL-MCNC: 111 MG/DL (ref 65–99)
HAV AB SER QL IA: REACTIVE
HCT VFR BLD AUTO: 42.3 % (ref 34.8–46.1)
HGB BLD-MCNC: 14.4 G/DL (ref 11.5–15.4)
IMM GRANULOCYTES # BLD AUTO: 0.01 THOUSAND/UL (ref 0–0.2)
IMM GRANULOCYTES NFR BLD AUTO: 0 % (ref 0–2)
LYMPHOCYTES # BLD AUTO: 1.61 THOUSANDS/ΜL (ref 0.6–4.47)
LYMPHOCYTES NFR BLD AUTO: 26 % (ref 14–44)
MCH RBC QN AUTO: 28.6 PG (ref 26.8–34.3)
MCHC RBC AUTO-ENTMCNC: 34 G/DL (ref 31.4–37.4)
MCV RBC AUTO: 84 FL (ref 82–98)
MONOCYTES # BLD AUTO: 0.48 THOUSAND/ΜL (ref 0.17–1.22)
MONOCYTES NFR BLD AUTO: 8 % (ref 4–12)
NEUTROPHILS # BLD AUTO: 3.93 THOUSANDS/ΜL (ref 1.85–7.62)
NEUTS SEG NFR BLD AUTO: 64 % (ref 43–75)
NRBC BLD AUTO-RTO: 0 /100 WBCS
PLATELET # BLD AUTO: 216 THOUSANDS/UL (ref 149–390)
PMV BLD AUTO: 8.9 FL (ref 8.9–12.7)
POTASSIUM SERPL-SCNC: 3.7 MMOL/L (ref 3.5–5.3)
PROT SERPL-MCNC: 7 G/DL (ref 6.4–8.4)
RBC # BLD AUTO: 5.04 MILLION/UL (ref 3.81–5.12)
SODIUM SERPL-SCNC: 139 MMOL/L (ref 135–147)
WBC # BLD AUTO: 6.19 THOUSAND/UL (ref 4.31–10.16)

## 2025-02-20 PROCEDURE — 86708 HEPATITIS A ANTIBODY: CPT

## 2025-02-20 PROCEDURE — 80053 COMPREHEN METABOLIC PANEL: CPT

## 2025-02-20 PROCEDURE — 76705 ECHO EXAM OF ABDOMEN: CPT

## 2025-02-20 PROCEDURE — 82105 ALPHA-FETOPROTEIN SERUM: CPT

## 2025-02-20 PROCEDURE — 85025 COMPLETE CBC W/AUTO DIFF WBC: CPT

## 2025-02-20 PROCEDURE — 36415 COLL VENOUS BLD VENIPUNCTURE: CPT

## 2025-07-29 ENCOUNTER — TELEPHONE (OUTPATIENT)
Dept: PULMONOLOGY | Facility: CLINIC | Age: 30
End: 2025-07-29

## 2025-08-15 ENCOUNTER — TELEPHONE (OUTPATIENT)
Dept: PULMONOLOGY | Facility: CLINIC | Age: 30
End: 2025-08-15

## (undated) DEVICE — MEDI-VAC YANK SUCT HNDL W/TPRD BULBOUS TIP: Brand: CARDINAL HEALTH

## (undated) DEVICE — CHLORAPREP HI-LITE 26ML ORANGE

## (undated) DEVICE — GLOVE SRG BIOGEL ECLIPSE 6

## (undated) DEVICE — TUBING SUCTION 5MM X 12 FT

## (undated) DEVICE — 5065 IMPAD REGULAR PAIR: Brand: A-V IMPULSE

## (undated) DEVICE — BETHLEHEM UNIVERSAL MINOR GEN: Brand: CARDINAL HEALTH

## (undated) DEVICE — ELECTRODE NEEDLE MOD E-Z CLEAN 2.75IN 7CM -0013M

## (undated) DEVICE — TELFA NON-ADHERENT ABSORBENT DRESSING: Brand: TELFA

## (undated) DEVICE — PAD GROUNDING DUAL ADULT

## (undated) DEVICE — SUT VICRYL 4-0 PS-2 27 IN J426H

## (undated) DEVICE — BULB SYRINGE,IRRIGATION WITH PROTECTIVE CAP: Brand: DOVER

## (undated) DEVICE — DECANTER: Brand: UNBRANDED

## (undated) DEVICE — ADHESIVE SKIN HIGH VISCOSITY EXOFIN 1ML

## (undated) DEVICE — NEPTUNE E-SEP SMOKE EVACUATION PENCIL, COATED, 70MM BLADE, PUSH BUTTON SWITCH: Brand: NEPTUNE E-SEP

## (undated) DEVICE — NEEDLE 25G X 1 1/2

## (undated) DEVICE — GLOVE INDICATOR PI UNDERGLOVE SZ 6.5 BLUE